# Patient Record
Sex: FEMALE | Race: WHITE | NOT HISPANIC OR LATINO | Employment: FULL TIME | URBAN - METROPOLITAN AREA
[De-identification: names, ages, dates, MRNs, and addresses within clinical notes are randomized per-mention and may not be internally consistent; named-entity substitution may affect disease eponyms.]

---

## 2019-05-25 ENCOUNTER — APPOINTMENT (EMERGENCY)
Dept: RADIOLOGY | Facility: HOSPITAL | Age: 54
End: 2019-05-25
Payer: COMMERCIAL

## 2019-05-25 ENCOUNTER — APPOINTMENT (EMERGENCY)
Dept: CT IMAGING | Facility: HOSPITAL | Age: 54
End: 2019-05-25
Payer: COMMERCIAL

## 2019-05-25 ENCOUNTER — HOSPITAL ENCOUNTER (EMERGENCY)
Facility: HOSPITAL | Age: 54
Discharge: HOME | End: 2019-05-25
Attending: EMERGENCY MEDICINE | Admitting: EMERGENCY MEDICINE
Payer: COMMERCIAL

## 2019-05-25 VITALS
OXYGEN SATURATION: 98 % | BODY MASS INDEX: 22.44 KG/M2 | RESPIRATION RATE: 16 BRPM | TEMPERATURE: 97.7 F | SYSTOLIC BLOOD PRESSURE: 138 MMHG | DIASTOLIC BLOOD PRESSURE: 76 MMHG | HEIGHT: 67 IN | HEART RATE: 75 BPM | WEIGHT: 143 LBS

## 2019-05-25 DIAGNOSIS — J32.9 CHRONIC CONGESTION OF PARANASAL SINUS: ICD-10-CM

## 2019-05-25 DIAGNOSIS — S90.112A: ICD-10-CM

## 2019-05-25 DIAGNOSIS — S00.83XA FACIAL CONTUSION, INITIAL ENCOUNTER: Primary | ICD-10-CM
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
## 2019-05-25 PROCEDURE — 73630 X-RAY EXAM OF FOOT: CPT

## 2019-05-25 PROCEDURE — 70486 CT MAXILLOFACIAL W/O DYE: CPT

## 2019-05-25 PROCEDURE — 99284 EMERGENCY DEPT VISIT MOD MDM: CPT

## 2019-05-25 PROCEDURE — 99284 EMERGENCY DEPT VISIT MOD MDM: CPT | Performed by: EMERGENCY MEDICINE

## 2019-05-25 RX ORDER — TRAMADOL HYDROCHLORIDE 50 MG/1
50 TABLET ORAL EVERY 6 HOURS PRN
Qty: 12 TABLET | Refills: 0 | Status: SHIPPED | OUTPATIENT
Start: 2019-05-25 | End: 2019-06-04

## 2019-05-25 RX ORDER — NAPROXEN 500 MG/1
500 TABLET ORAL 2 TIMES DAILY WITH MEALS
Qty: 30 TABLET | Refills: 0 | Status: SHIPPED | OUTPATIENT
Start: 2019-05-25 | End: 2019-11-11

## 2019-05-25 RX ORDER — GINSENG 100 MG
1 CAPSULE ORAL ONCE
Status: COMPLETED | OUTPATIENT
Start: 2019-05-25 | End: 2019-05-25

## 2019-05-25 RX ORDER — METHOCARBAMOL 750 MG/1
750 TABLET, FILM COATED ORAL 3 TIMES DAILY PRN
Qty: 30 TABLET | Refills: 0 | Status: SHIPPED | OUTPATIENT
Start: 2019-05-25 | End: 2019-11-11

## 2019-05-25 RX ADMIN — BACITRACIN 1 SMALL APPLICATION: 500 OINTMENT TOPICAL at 19:21

## 2019-07-09 ENCOUNTER — HOSPITAL ENCOUNTER (OUTPATIENT)
Dept: RADIOLOGY | Facility: HOSPITAL | Age: 54
Discharge: HOME | End: 2019-07-09
Attending: FAMILY MEDICINE
Payer: COMMERCIAL

## 2019-07-09 ENCOUNTER — OFFICE VISIT (OUTPATIENT)
Dept: OBGYN CLINIC | Facility: CLINIC | Age: 54
End: 2019-07-09
Payer: COMMERCIAL

## 2019-07-09 ENCOUNTER — TELEPHONE (OUTPATIENT)
Dept: OBGYN CLINIC | Facility: HOSPITAL | Age: 54
End: 2019-07-09

## 2019-07-09 ENCOUNTER — APPOINTMENT (OUTPATIENT)
Dept: RADIOLOGY | Facility: CLINIC | Age: 54
End: 2019-07-09
Payer: COMMERCIAL

## 2019-07-09 VITALS
HEIGHT: 67 IN | BODY MASS INDEX: 24.64 KG/M2 | DIASTOLIC BLOOD PRESSURE: 70 MMHG | SYSTOLIC BLOOD PRESSURE: 136 MMHG | WEIGHT: 157 LBS

## 2019-07-09 DIAGNOSIS — M25.521 PAIN IN RIGHT ELBOW: Primary | ICD-10-CM

## 2019-07-09 DIAGNOSIS — M77.11 LATERAL EPICONDYLITIS OF RIGHT ELBOW: ICD-10-CM

## 2019-07-09 DIAGNOSIS — M25.521 PAIN IN RIGHT ELBOW: ICD-10-CM

## 2019-07-09 DIAGNOSIS — M25.421 EFFUSION OF RIGHT ELBOW: ICD-10-CM

## 2019-07-09 PROCEDURE — 73080 X-RAY EXAM OF ELBOW: CPT

## 2019-07-09 PROCEDURE — 99204 OFFICE O/P NEW MOD 45 MIN: CPT | Performed by: FAMILY MEDICINE

## 2019-07-09 NOTE — TELEPHONE ENCOUNTER
Urgent care just called stating this patient's x-ray ended up in their work que  She just wanted to let you know
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
## 2019-07-12 ENCOUNTER — HOSPITAL ENCOUNTER (OUTPATIENT)
Dept: MRI IMAGING | Facility: HOSPITAL | Age: 54
Discharge: HOME | End: 2019-07-12
Attending: FAMILY MEDICINE
Payer: COMMERCIAL

## 2019-07-12 DIAGNOSIS — M25.421 EFFUSION OF RIGHT ELBOW: ICD-10-CM

## 2019-07-12 DIAGNOSIS — M25.521 PAIN IN RIGHT ELBOW: ICD-10-CM

## 2019-07-12 PROCEDURE — 73221 MRI JOINT UPR EXTREM W/O DYE: CPT

## 2019-07-16 ENCOUNTER — OFFICE VISIT (OUTPATIENT)
Dept: OBGYN CLINIC | Facility: CLINIC | Age: 54
End: 2019-07-16
Payer: COMMERCIAL

## 2019-07-16 VITALS
DIASTOLIC BLOOD PRESSURE: 66 MMHG | SYSTOLIC BLOOD PRESSURE: 119 MMHG | BODY MASS INDEX: 23.86 KG/M2 | WEIGHT: 152 LBS | HEIGHT: 67 IN

## 2019-07-16 DIAGNOSIS — S53.441A TEAR OF ULNAR COLLATERAL LIGAMENT OF RIGHT ELBOW, INITIAL ENCOUNTER: ICD-10-CM

## 2019-07-16 DIAGNOSIS — M25.521 PAIN IN RIGHT ELBOW: Primary | ICD-10-CM

## 2019-07-16 PROCEDURE — 99214 OFFICE O/P EST MOD 30 MIN: CPT | Performed by: FAMILY MEDICINE
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
## 2019-07-17 ENCOUNTER — TELEPHONE (OUTPATIENT)
Dept: OBGYN CLINIC | Facility: HOSPITAL | Age: 54
End: 2019-07-17
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
## 2019-07-18 NOTE — TELEPHONE ENCOUNTER
This was discussed at length during her appointment  My instructions were no bike riding at this time

## 2019-07-19 NOTE — TELEPHONE ENCOUNTER
I spoke with patient and advised she is not to ride bike at this time and Dr Vee Daniel did not give orders to immobilize at this time  Follow up with Dr Speedy Grimm on 7/29
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
## 2019-07-29 ENCOUNTER — OFFICE VISIT (OUTPATIENT)
Dept: OBGYN CLINIC | Facility: CLINIC | Age: 54
End: 2019-07-29
Payer: COMMERCIAL

## 2019-07-29 VITALS
DIASTOLIC BLOOD PRESSURE: 73 MMHG | SYSTOLIC BLOOD PRESSURE: 111 MMHG | WEIGHT: 154.2 LBS | HEIGHT: 67 IN | BODY MASS INDEX: 24.2 KG/M2 | HEART RATE: 67 BPM

## 2019-07-29 DIAGNOSIS — S53.441A TEAR OF ULNAR COLLATERAL LIGAMENT OF RIGHT ELBOW, INITIAL ENCOUNTER: Primary | ICD-10-CM

## 2019-07-29 DIAGNOSIS — M25.521 PAIN IN RIGHT ELBOW: ICD-10-CM

## 2019-07-29 PROCEDURE — 99213 OFFICE O/P EST LOW 20 MIN: CPT | Performed by: ORTHOPAEDIC SURGERY
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
## 2019-07-30 ENCOUNTER — EVALUATION (OUTPATIENT)
Dept: OCCUPATIONAL THERAPY | Facility: CLINIC | Age: 54
End: 2019-07-30
Payer: COMMERCIAL

## 2019-07-30 DIAGNOSIS — S53.441A TEAR OF ULNAR COLLATERAL LIGAMENT OF RIGHT ELBOW, INITIAL ENCOUNTER: ICD-10-CM

## 2019-07-30 PROCEDURE — 97165 OT EVAL LOW COMPLEX 30 MIN: CPT | Performed by: OCCUPATIONAL THERAPIST

## 2019-07-30 PROCEDURE — 97140 MANUAL THERAPY 1/> REGIONS: CPT | Performed by: OCCUPATIONAL THERAPIST

## 2019-07-30 PROCEDURE — 97110 THERAPEUTIC EXERCISES: CPT | Performed by: OCCUPATIONAL THERAPIST

## 2019-07-30 PROCEDURE — 97035 APP MDLTY 1+ULTRASOUND EA 15: CPT | Performed by: OCCUPATIONAL THERAPIST
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
## 2019-08-01 ENCOUNTER — OFFICE VISIT (OUTPATIENT)
Dept: OCCUPATIONAL THERAPY | Facility: CLINIC | Age: 54
End: 2019-08-01
Payer: COMMERCIAL

## 2019-08-01 DIAGNOSIS — S53.441A TEAR OF ULNAR COLLATERAL LIGAMENT OF RIGHT ELBOW, INITIAL ENCOUNTER: Primary | ICD-10-CM

## 2019-08-01 PROCEDURE — 97035 APP MDLTY 1+ULTRASOUND EA 15: CPT | Performed by: OCCUPATIONAL THERAPIST

## 2019-08-01 PROCEDURE — 97140 MANUAL THERAPY 1/> REGIONS: CPT | Performed by: OCCUPATIONAL THERAPIST

## 2019-08-01 PROCEDURE — 97110 THERAPEUTIC EXERCISES: CPT | Performed by: OCCUPATIONAL THERAPIST

## 2019-08-01 PROCEDURE — 97530 THERAPEUTIC ACTIVITIES: CPT | Performed by: OCCUPATIONAL THERAPIST

## 2019-08-01 NOTE — PROGRESS NOTES
Daily Note     Today's date: 2019  Patient name: Abelino Fang  : 1965  MRN: [id number]  Referring provider: Kt Lujan  Dx:   Encounter Diagnosis     ICD-10-CM    1  Tear of ulnar collateral ligament of right elbow, initial encounter S53 441A                   Subjective: I have been really sore       Objective: See treatment diary below      Assessment: Tolerated treatment well  Patient has local tenderness in the medial forearm  She has full ROM post tx   Plan: Continue per plan of care        Precautions:  UCL tear R elbow      Manual             retrograde 5m 5m           MFR flexor/pro  5m 5m                                     Ktape flexor/cubital 2m 2m               Exercise Diary             Shoulder ROM 10x 10x           Elbow ROM supine shld 90 10x 10x           Wrist ROM 10x 10x           Wrist e/f/rd  Elbow 90 3x10 2#  3x10           Hammer  p/s 3x10 3x10             ER/PRO on table  2#  3x10           Shoulder raise   2#  3x10                                                                                                                                                                       HEP - ROM and prec 4x day                Modalities             Cont US med elbow 1 2 8m 8m           CP post  5m

## 2019-08-06 ENCOUNTER — OFFICE VISIT (OUTPATIENT)
Dept: OCCUPATIONAL THERAPY | Facility: CLINIC | Age: 54
End: 2019-08-06
Payer: COMMERCIAL

## 2019-08-06 DIAGNOSIS — S53.441A TEAR OF ULNAR COLLATERAL LIGAMENT OF RIGHT ELBOW, INITIAL ENCOUNTER: Primary | ICD-10-CM

## 2019-08-06 PROCEDURE — 97140 MANUAL THERAPY 1/> REGIONS: CPT | Performed by: OCCUPATIONAL THERAPIST

## 2019-08-06 PROCEDURE — 97110 THERAPEUTIC EXERCISES: CPT | Performed by: OCCUPATIONAL THERAPIST

## 2019-08-06 PROCEDURE — 97035 APP MDLTY 1+ULTRASOUND EA 15: CPT | Performed by: OCCUPATIONAL THERAPIST

## 2019-08-06 PROCEDURE — 97530 THERAPEUTIC ACTIVITIES: CPT | Performed by: OCCUPATIONAL THERAPIST

## 2019-08-06 NOTE — PROGRESS NOTES
Daily Note     Today's date: 2019  Patient name: Francine Santiago  : 1965  MRN: [id number]  Referring provider: Fiorella Salter  Dx:   Encounter Diagnosis     ICD-10-CM    1  Tear of ulnar collateral ligament of right elbow, initial encounter S53 441A                   Subjective: I was sore      Objective: See treatment diary below      Assessment: Tolerated treatment well  Patient has full  Elbow ROm flexion   Sonido Balint She has mild tenderness  No loading of UCL during tx    Plan: Continue per plan of care        Precautions:  UCL tear R elbow      Manual            retrograde 5m 5m 5m          MFR flexor/pro  5m 5m 5m                                    Ktape flexor/cubital 2m 2m 2m              Exercise Diary            Shoulder ROM 10x 10x 10x          Elbow ROM supine shld 90 10x 10x 10x          Wrist ROM 10x 10x 10x          Wrist e/f/rd  Elbow 90 3x10 2#  3x10 2#  3x10          Hammer  p/s 3x10 3x10 3x10            ER/PRO on table  2#  3x10 2#  3x10          Shoulder raise   2#  3x10 2#  3x10                                                                                                                                                                      HEP - ROM and prec 4x day                Modalities   8          Cont US med elbow 1 2 8m 8m 8m          CP post  5m 5m

## 2019-08-08 ENCOUNTER — OFFICE VISIT (OUTPATIENT)
Dept: OCCUPATIONAL THERAPY | Facility: CLINIC | Age: 54
End: 2019-08-08
Payer: COMMERCIAL

## 2019-08-08 DIAGNOSIS — S53.441A TEAR OF ULNAR COLLATERAL LIGAMENT OF RIGHT ELBOW, INITIAL ENCOUNTER: Primary | ICD-10-CM

## 2019-08-08 PROCEDURE — 97035 APP MDLTY 1+ULTRASOUND EA 15: CPT | Performed by: OCCUPATIONAL THERAPIST

## 2019-08-08 PROCEDURE — 97110 THERAPEUTIC EXERCISES: CPT | Performed by: OCCUPATIONAL THERAPIST

## 2019-08-08 PROCEDURE — 97530 THERAPEUTIC ACTIVITIES: CPT | Performed by: OCCUPATIONAL THERAPIST

## 2019-08-08 PROCEDURE — 97140 MANUAL THERAPY 1/> REGIONS: CPT | Performed by: OCCUPATIONAL THERAPIST

## 2019-08-13 ENCOUNTER — OFFICE VISIT (OUTPATIENT)
Dept: OCCUPATIONAL THERAPY | Facility: CLINIC | Age: 54
End: 2019-08-13
Payer: COMMERCIAL

## 2019-08-13 DIAGNOSIS — S53.441A TEAR OF ULNAR COLLATERAL LIGAMENT OF RIGHT ELBOW, INITIAL ENCOUNTER: Primary | ICD-10-CM

## 2019-08-13 PROCEDURE — 97035 APP MDLTY 1+ULTRASOUND EA 15: CPT | Performed by: OCCUPATIONAL THERAPIST

## 2019-08-13 PROCEDURE — 97530 THERAPEUTIC ACTIVITIES: CPT | Performed by: OCCUPATIONAL THERAPIST

## 2019-08-13 PROCEDURE — 97140 MANUAL THERAPY 1/> REGIONS: CPT | Performed by: OCCUPATIONAL THERAPIST

## 2019-08-13 PROCEDURE — 97110 THERAPEUTIC EXERCISES: CPT | Performed by: OCCUPATIONAL THERAPIST

## 2019-08-13 NOTE — PROGRESS NOTES
Daily Note     Today's date: 2019  Patient name: Liliana Garcia  : 1965  MRN: [id number]  Referring provider: Nima Coppola  Dx:   Encounter Diagnosis     ICD-10-CM    1  Tear of ulnar collateral ligament of right elbow, initial encounter S53 441A                   Subjective: I am sore   Objective: See treatment diary below      Assessment: Tolerated treatment well  Patient has full ROM   She has tender flexor and biceps   Plan: Continue per plan of care        Precautions:  UCL tear R elbow      Manual          retrograde 5m 5m 5m 5m 5m        MFR flexor/pro  5m 5m 5m 5m 5m                                  Ktape flexor/cubital 2m 2m 2m 2m 2m            Exercise Diary          Shoulder ROM 10x 10x 10x 10x 10x        Elbow ROM supine shld 90 10x 10x 10x 10x 10x        Wrist ROM 10x 10x 10x 10x 10x        Wrist e/f/rd  Elbow 90 3x10 2#  3x10 2#  3x10 3#  3x10 3#  3x10        Hammer  p/s 3x10 3x10 3x10 3x10 3x10          ER/PRO on table  2#  3x10 2#  3x10 3#  3x10 3#  3x10        Shoulder raise   2#  3x10 2#  3x10 3#  3x10 3#  3x10                                                                                                                                                                    HEP - ROM and prec 4x day                Modalities           Cont US med elbow 1 2 8m 8m 8m 8m         CP post  5m 5m 5m

## 2019-08-15 ENCOUNTER — APPOINTMENT (OUTPATIENT)
Dept: OCCUPATIONAL THERAPY | Facility: CLINIC | Age: 54
End: 2019-08-15
Payer: COMMERCIAL

## 2019-08-20 ENCOUNTER — OFFICE VISIT (OUTPATIENT)
Dept: OCCUPATIONAL THERAPY | Facility: CLINIC | Age: 54
End: 2019-08-20
Payer: COMMERCIAL

## 2019-08-20 DIAGNOSIS — S53.441A TEAR OF ULNAR COLLATERAL LIGAMENT OF RIGHT ELBOW, INITIAL ENCOUNTER: Primary | ICD-10-CM

## 2019-08-20 PROCEDURE — 97110 THERAPEUTIC EXERCISES: CPT | Performed by: OCCUPATIONAL THERAPIST

## 2019-08-20 PROCEDURE — 97035 APP MDLTY 1+ULTRASOUND EA 15: CPT | Performed by: OCCUPATIONAL THERAPIST

## 2019-08-20 PROCEDURE — 97140 MANUAL THERAPY 1/> REGIONS: CPT | Performed by: OCCUPATIONAL THERAPIST

## 2019-08-20 NOTE — PROGRESS NOTES
Daily Note     Today's date: 2019  Patient name: Denise Moctezuma  : 1965  MRN: [id number]  Referring provider: Eliana Rea  Dx:   Encounter Diagnosis     ICD-10-CM    1  Tear of ulnar collateral ligament of right elbow, initial encounter S53 441A                   Subjective: I am feeling pretty good  I have been taking the brace off at night      Objective: See treatment diary below      Assessment: Tolerated treatment well  Patient has improved symptoms      Plan: Continue per plan of care        Precautions:  UCL tear R elbow      Manual         retrograde 5m 5m 5m 5m 5m 5m       MFR flexor/pro  5m 5m 5m 5m 5m 5m                                 Ktape flexor/cubital 2m 2m 2m 2m 2m 2m           Exercise Diary         Shoulder ROM 10x 10x 10x 10x 10x 10x       Elbow ROM supine shld 90 10x 10x 10x 10x 10x 10x       Wrist ROM 10x 10x 10x 10x 10x 10x       Wrist e/f/rd  Elbow 90 3x10 2#  3x10 2#  3x10 3#  3x10 3#  3x10 3#  3x10       Hammer  p/s 3x10 3x10 3x10 3x10 3x10 3x10         ER/PRO on table  2#  3x10 2#  3x10 3#  3x10 3#  3x10 3#  3x10       Shoulder raise   2#  3x10 2#  3x10 3#  3x10 3#  3x10 3#  3x10        PW      Black  3x10                                                                                                                                                      HEP - ROM and prec 4x day                Modalities          Cont US med elbow 1 2 8m 8m 8m 8m 8m        CP post  5m 5m 5m 5m

## 2019-08-22 ENCOUNTER — OFFICE VISIT (OUTPATIENT)
Dept: OCCUPATIONAL THERAPY | Facility: CLINIC | Age: 54
End: 2019-08-22
Payer: COMMERCIAL

## 2019-08-22 DIAGNOSIS — S53.441A TEAR OF ULNAR COLLATERAL LIGAMENT OF RIGHT ELBOW, INITIAL ENCOUNTER: Primary | ICD-10-CM

## 2019-08-22 PROCEDURE — 97140 MANUAL THERAPY 1/> REGIONS: CPT | Performed by: OCCUPATIONAL THERAPIST

## 2019-08-22 PROCEDURE — 97530 THERAPEUTIC ACTIVITIES: CPT | Performed by: OCCUPATIONAL THERAPIST

## 2019-08-22 PROCEDURE — 97110 THERAPEUTIC EXERCISES: CPT | Performed by: OCCUPATIONAL THERAPIST

## 2019-08-22 PROCEDURE — 97035 APP MDLTY 1+ULTRASOUND EA 15: CPT | Performed by: OCCUPATIONAL THERAPIST

## 2019-08-22 NOTE — PROGRESS NOTES
Daily Note     Today's date: 2019  Patient name: Elva Emery  : 1965  MRN: [id number]  Referring provider: Radha Rivera  Dx:   Encounter Diagnosis     ICD-10-CM    1  Tear of ulnar collateral ligament of right elbow, initial encounter S53 441A                   Subjective: I am a little sore       Objective: See treatment diary below      Assessment: Tolerated treatment well  Patient has tender pronator  Plan: Continue per plan of care        Precautions:  UCL tear R elbow      Manual        retrograde 5m 5m 5m 5m 5m 5m 5m      MFR flexor/pro  5m 5m 5m 5m 5m 5m 5m                                Ktape flexor/cubital 2m 2m 2m 2m 2m 2m 2m          Exercise Diary        Shoulder ROM 10x 10x 10x 10x 10x 10x 10x      Elbow ROM supine shld 90 10x 10x 10x 10x 10x 10x 10x      Wrist ROM 10x 10x 10x 10x 10x 10x 10x      Wrist e/f/rd  Elbow 90 3x10 2#  3x10 2#  3x10 3#  3x10 3#  3x10 3#  3x10 3#  3x10      Hammer  p/s 3x10 3x10 3x10 3x10 3x10 3x10 3x10        ER/PRO on table  2#  3x10 2#  3x10 3#  3x10 3#  3x10 3#  3x10 3#  3x10      Shoulder raise   2#  3x10 2#  3x10 3#  3x10 3#  3x10 3#  3x10 3#  3x10       PW      Black  3x10 Black  3x10                                                                                                                                                     HEP - ROM and prec 4x day                Modalities         Cont US med elbow 1 2 8m 8m 8m 8m 8m 8m       CP post  5m 5m 5m 5m 5m

## 2019-08-29 ENCOUNTER — OFFICE VISIT (OUTPATIENT)
Dept: OCCUPATIONAL THERAPY | Facility: CLINIC | Age: 54
End: 2019-08-29
Payer: COMMERCIAL

## 2019-08-29 DIAGNOSIS — S53.441A TEAR OF ULNAR COLLATERAL LIGAMENT OF RIGHT ELBOW, INITIAL ENCOUNTER: Primary | ICD-10-CM

## 2019-08-29 PROCEDURE — 97140 MANUAL THERAPY 1/> REGIONS: CPT | Performed by: OCCUPATIONAL THERAPIST

## 2019-08-29 PROCEDURE — 97110 THERAPEUTIC EXERCISES: CPT | Performed by: OCCUPATIONAL THERAPIST

## 2019-08-29 PROCEDURE — 97530 THERAPEUTIC ACTIVITIES: CPT | Performed by: OCCUPATIONAL THERAPIST

## 2019-08-29 NOTE — PROGRESS NOTES
Daily Note     Today's date: 2019  Patient name: Lynda Vargas  : 1965  MRN: [id number]  Referring provider: Floridalma Guerra  Dx:   Encounter Diagnosis     ICD-10-CM    1  Tear of ulnar collateral ligament of right elbow, initial encounter S53 441A                   Subjective: I am doing       Objective: See treatment diary below      Assessment: Tolerated treatment well  Patient has full ROM   She has continued tenderness  Plan: Continue per plan of care        Precautions:  UCL tear R elbow      Manual       retrograde 5m 5m 5m 5m 5m 5m 5m 5m     MFR flexor/pro  5m 5m 5m 5m 5m 5m 5m 5m                               Ktape flexor/cubital 2m 2m 2m 2m 2m 2m 2m          Exercise Diary       Shoulder ROM 10x 10x 10x 10x 10x 10x 10x 10x     Elbow ROM supine shld 90 10x 10x 10x 10x 10x 10x 10x 10x     Wrist ROM 10x 10x 10x 10x 10x 10x 10x 10x     Wrist e/f/rd  Elbow 90 3x10 2#  3x10 2#  3x10 3#  3x10 3#  3x10 3#  3x10 3#  3x10 4#  3x10     Hammer  p/s 3x10 3x10 3x10 3x10 3x10 3x10 3x10 3x10       ER/PRO on table  2#  3x10 2#  3x10 3#  3x10 3#  3x10 3#  3x10 3#  3x10 4#  3x10     Shoulder raise   2#  3x10 2#  3x10 3#  3x10 3#  3x10 3#  3x10 3#  3x10 4#  3x10      PW      Black  3x10 Black  3x10 Black  3x10                                                                                                                                                    HEP - ROM and prec 4x day                Modalities         Cont US med elbow 1 2 8m 8m 8m 8m 8m 8m       CP post  5m 5m 5m 5m 5m

## 2019-09-11 ENCOUNTER — OFFICE VISIT (OUTPATIENT)
Dept: OBGYN CLINIC | Facility: HOSPITAL | Age: 54
End: 2019-09-11
Payer: COMMERCIAL

## 2019-09-11 VITALS
BODY MASS INDEX: 24.96 KG/M2 | WEIGHT: 159 LBS | HEIGHT: 67 IN | HEART RATE: 65 BPM | DIASTOLIC BLOOD PRESSURE: 73 MMHG | SYSTOLIC BLOOD PRESSURE: 110 MMHG

## 2019-09-11 DIAGNOSIS — S53.441D TEAR OF ULNAR COLLATERAL LIGAMENT OF RIGHT ELBOW, SUBSEQUENT ENCOUNTER: Primary | ICD-10-CM

## 2019-09-11 PROCEDURE — 99213 OFFICE O/P EST LOW 20 MIN: CPT | Performed by: ORTHOPAEDIC SURGERY

## 2019-09-11 NOTE — PROGRESS NOTES
ASSESSMENT/PLAN:    Assessment:   Right elbow ulnar collateral ligament tear healing    Plan:   Continue with activity modification   Continue with physical therapy   Discontinue brace   Lengthy discussion was had with the patient regarding this  She is aware that this does require time for the ligament to heal   We once again stressed the avoidance of all significant stressful activities in till the elbow was strength in  She will continue with therapy  She voices understanding and will try to comply  Follow Up:  8  week(s)    To Do Next Visit:  Re-evaluate     General Discussions:      Operative Discussions:   patient was advised that I would not recommend surgical intervention to repair the UCL    _____________________________________________________  CHIEF COMPLAINT:  Chief Complaint   Patient presents with    Left Elbow - Follow-up         SUBJECTIVE:  Cassidy Cervantes is a 49 y o  female who presents for follow up regarding for follow-up evaluation of her right elbow  Her last evaluation patient was allowed to unlock her hinged elbow brace  She has been compliant attending hand therapy for range of motion and strengthening exercises  Patient states she is overall very frustrated with her progress  She still notes pain about the elbow  She states her pain is limiting her from doing her activities of daily living  She has not been able to return to mountain biking  She states she has been compliant wearing her brace however at times she notices she removes it at night in her sleep  She would like to get back to biking and would like to discuss surgical intervention as well as other options today      PAST MEDICAL HISTORY:  Past Medical History:   Diagnosis Date    Plantar fasciitis     Last assessed 2015        PAST SURGICAL HISTORY:  Past Surgical History:   Procedure Laterality Date    DILATION AND CURETTAGE OF UTERUS      INDUCED       Surgical treatment of spontaneous  FAMILY HISTORY:  Family History   Problem Relation Age of Onset    No Known Problems Mother     No Known Problems Father        SOCIAL HISTORY:  Social History     Tobacco Use    Smoking status: Never Smoker    Smokeless tobacco: Never Used   Substance Use Topics    Alcohol use: Never     Frequency: Never    Drug use: Never       MEDICATIONS:    Current Outpatient Medications:     Elastic Bandages & Supports (ADJUSTABLE ARM SLING) MISC, by Does not apply route daily (Patient not taking: Reported on 7/29/2019), Disp: 1 each, Rfl: 0    methocarbamol (ROBAXIN) 750 mg tablet, Take 1 tablet (750 mg total) by mouth 3 (three) times a day as needed for muscle spasms (Patient not taking: Reported on 7/29/2019), Disp: 30 tablet, Rfl: 0    naproxen (NAPROSYN) 500 mg tablet, Take 1 tablet (500 mg total) by mouth 2 (two) times a day with meals (Patient not taking: Reported on 7/29/2019), Disp: 30 tablet, Rfl: 0    ALLERGIES:  No Known Allergies    REVIEW OF SYSTEMS:  Pertinent items are noted in HPI      LABS:  HgA1c: No results found for: HGBA1C  BMP: No results found for: GLUCOSE, CALCIUM, NA, K, CO2, CL, BUN, CREATININE        _____________________________________________________  PHYSICAL EXAMINATION:  Vital signs: /73   Pulse 65   Ht 5' 7" (1 702 m)   Wt 72 1 kg (159 lb)   BMI 24 90 kg/m²   General: well developed and well nourished, alert, oriented times 3 and appears comfortable  Psychiatric: Normal  HEENT: Trachea Midline, No torticollis  Cardiovascular: No discernable arrhythmia  Pulmonary: No wheezing or stridor  Skin: No masses, erthema, lacerations, fluctation, ulcerations  Neurovascular: Sensation Intact to the Median, Ulnar, Radial Nerve, Motor Intact to the Median, Ulnar, Radial Nerve and Pulses Intact    MUSCULOSKELETAL EXAMINATION:  RIGHT SIDE:  Elbow:  Full Motion, No Tenderness, No Instability, minimal pain with milking maneuver _____________________________________________________  STUDIES REVIEWED:  No Studies to review      PROCEDURES PERFORMED:  Procedures  No Procedures performed today   Scribe Attestation    I,:   Shabbir Armijo MA am acting as a scribe while in the presence of the attending physician :        I,:   Kristel Coles MD personally performed the services described in this documentation    as scribed in my presence :

## 2019-09-12 ENCOUNTER — OFFICE VISIT (OUTPATIENT)
Dept: OCCUPATIONAL THERAPY | Facility: CLINIC | Age: 54
End: 2019-09-12
Payer: COMMERCIAL

## 2019-09-12 DIAGNOSIS — S53.441D TEAR OF ULNAR COLLATERAL LIGAMENT OF RIGHT ELBOW, SUBSEQUENT ENCOUNTER: ICD-10-CM

## 2019-09-12 PROCEDURE — 97530 THERAPEUTIC ACTIVITIES: CPT | Performed by: OCCUPATIONAL THERAPIST

## 2019-09-12 PROCEDURE — 97035 APP MDLTY 1+ULTRASOUND EA 15: CPT | Performed by: OCCUPATIONAL THERAPIST

## 2019-09-12 PROCEDURE — 97140 MANUAL THERAPY 1/> REGIONS: CPT | Performed by: OCCUPATIONAL THERAPIST

## 2019-09-12 PROCEDURE — 97110 THERAPEUTIC EXERCISES: CPT | Performed by: OCCUPATIONAL THERAPIST

## 2019-09-13 NOTE — PROGRESS NOTES
Daily Note     Today's date:2019  Patient name: Janna Baldwin  : 1965  MRN: [id number]  Referring provider: Jarred Mckinney  Dx:   Encounter Diagnosis     ICD-10-CM    1  Tear of ulnar collateral ligament of right elbow, subsequent encounter S53 441D Ambulatory referral to PT/OT hand therapy                  Subjective: Dr Deonna Godoy said no brace   I am doing better but I want to bike      Objective: See treatment diary below      Assessment: Tolerated treatment well  Patient had mild fatigue   She has mild pronator tenderness  Plan: Continue per plan of care        Precautions:  UCL tear R elbow        Manual              MOB              MFR flexor/pro  5m            Graston flexor/pronator at elbow 5m                         Ktape flexor/cubital                 Exercise Diary              Shoulder ROM 10x              10x            Wrist ROM 10x            Wrist e/f/rd  Elbow 90 4#  3x10              Hammer  p/s 3x10            flexbar Red   3x10            Biceps curl Dowel 8#  3x10            UBE 6M             Tband ret Blue   3x10            Tband shld ext Blue  3x10            powerweb  Blue 3x10            ER/IR 3#  3x10                                                                                                       HEP - ROM and prec 4x day                Modalities              cont US med elbow 1 2 8m                                            Manual       retrograde 5m 5m 5m 5m 5m 5m 5m 5m     MFR flexor/pro  5m 5m 5m 5m 5m 5m 5m 5m                               Ktape flexor/cubital 2m 2m 2m 2m 2m 2m 2m          Exercise Diary       Shoulder ROM 10x 10x 10x 10x 10x 10x 10x 10x     Elbow ROM supine shld 90 10x 10x 10x 10x 10x 10x 10x 10x     Wrist ROM 10x 10x 10x 10x 10x 10x 10x 10x     Wrist e/f/rd  Elbow 90 3x10 2#  3x10 2#  3x10 3#  3x10 3#  3x10 3#  3x10 3#  3x10 4#  3x10     Hammer  p/s 3x10 3x10 3x10 3x10 3x10 3x10 3x10 3x10       ER/PRO on table  2#  3x10 2#  3x10 3#  3x10 3#  3x10 3#  3x10 3#  3x10 4#  3x10     Shoulder raise   2#  3x10 2#  3x10 3#  3x10 3#  3x10 3#  3x10 3#  3x10 4#  3x10      PW      Black  3x10 Black  3x10 Black  3x10                                                                                                                                                    HEP - ROM and prec 4x day                Modalities  7/30 8/1 8/6 8/8 8/20 8/22       Cont US med elbow 1 2 8m 8m 8m 8m 8m 8m       CP post  5m 5m 5m 5m 5m

## 2019-09-19 ENCOUNTER — OFFICE VISIT (OUTPATIENT)
Dept: OCCUPATIONAL THERAPY | Facility: CLINIC | Age: 54
End: 2019-09-19
Payer: COMMERCIAL

## 2019-09-19 ENCOUNTER — APPOINTMENT (OUTPATIENT)
Dept: OCCUPATIONAL THERAPY | Facility: CLINIC | Age: 54
End: 2019-09-19
Payer: COMMERCIAL

## 2019-09-19 DIAGNOSIS — S53.441D TEAR OF ULNAR COLLATERAL LIGAMENT OF RIGHT ELBOW, SUBSEQUENT ENCOUNTER: Primary | ICD-10-CM

## 2019-09-19 PROCEDURE — 97530 THERAPEUTIC ACTIVITIES: CPT | Performed by: OCCUPATIONAL THERAPIST

## 2019-09-19 PROCEDURE — 97110 THERAPEUTIC EXERCISES: CPT | Performed by: OCCUPATIONAL THERAPIST

## 2019-09-19 PROCEDURE — 97140 MANUAL THERAPY 1/> REGIONS: CPT | Performed by: OCCUPATIONAL THERAPIST

## 2019-09-19 PROCEDURE — 97035 APP MDLTY 1+ULTRASOUND EA 15: CPT | Performed by: OCCUPATIONAL THERAPIST

## 2019-09-19 NOTE — PROGRESS NOTES
Daily Note     Today's date: 2019  Patient name: Clint Herndon  : 1965  MRN: [id number]  Referring provider: Mirtha Hutchins  Dx:   Encounter Diagnosis     ICD-10-CM    1  Tear of ulnar collateral ligament of right elbow, subsequent encounter S53 441D                   Subjective: I am sore      Objective: See treatment diary below      Assessment: Tolerated treatment well  Patient has full ROM   She tenderness medial elbow   Plan: Continue per plan of care        Precautions:  UCL tear R elbow        Manual             MOB              MFR flexor/pro  5m 5m           Graston flexor/pronator at elbow 5m 5m                        Ktape flexor/cubital                 Exercise Diary  9/12 9/10           Shoulder ROM 10x 10x             10x 10x           Wrist ROM 10x 10x           Wrist e/f/rd  Elbow 90 4#  3x10   4#  3x10           Hammer  p/s 3x10 3x10           flexbar Red   3x10 Red  3x10           Biceps curl Dowel 8#  3x10 Dowel 8#  3x10           UBE 6M  6m           Tband ret Blue   3x10 Blue  3x10           Tband shld ext Blue  3x10 Blue  3x10           powerweb  Blue 3x10 Blue   3x10           ER/IR 3#  3x10 3#  3x10                                                                                                      HEP - ROM and prec 4x day                Modalities              cont US med elbow 1 2 8m                                            Manual       retrograde 5m 5m 5m 5m 5m 5m 5m 5m     MFR flexor/pro  5m 5m 5m 5m 5m 5m 5m 5m                               Ktape flexor/cubital 2m 2m 2m 2m 2m 2m 2m          Exercise Diary       Shoulder ROM 10x 10x 10x 10x 10x 10x 10x 10x     Elbow ROM supine shld 90 10x 10x 10x 10x 10x 10x 10x 10x     Wrist ROM 10x 10x 10x 10x 10x 10x 10x 10x     Wrist e/f/rd  Elbow 90 3x10 2#  3x10 2#  3x10 3#  3x10 3#  3x10 3#  3x10 3#  3x10 4#  3x10     Hammer  p/s 3x10 3x10 3x10 3x10 3x10 3x10 3x10 3x10       ER/PRO on table  2#  3x10 2#  3x10 3#  3x10 3#  3x10 3#  3x10 3#  3x10 4#  3x10     Shoulder raise   2#  3x10 2#  3x10 3#  3x10 3#  3x10 3#  3x10 3#  3x10 4#  3x10      PW      Black  3x10 Black  3x10 Black  3x10                                                                                                                                                    HEP - ROM and prec 4x day                Modalities  7/30 8/1 8/6 8/8 8/20 8/22       Cont US med elbow 1 2 8m 8m 8m 8m 8m 8m       CP post  5m 5m 5m 5m 5m

## 2019-09-24 ENCOUNTER — OFFICE VISIT (OUTPATIENT)
Dept: OCCUPATIONAL THERAPY | Facility: CLINIC | Age: 54
End: 2019-09-24
Payer: COMMERCIAL

## 2019-09-24 DIAGNOSIS — S53.441D TEAR OF ULNAR COLLATERAL LIGAMENT OF RIGHT ELBOW, SUBSEQUENT ENCOUNTER: Primary | ICD-10-CM

## 2019-09-24 PROCEDURE — 97140 MANUAL THERAPY 1/> REGIONS: CPT | Performed by: OCCUPATIONAL THERAPIST

## 2019-09-24 PROCEDURE — 97530 THERAPEUTIC ACTIVITIES: CPT | Performed by: OCCUPATIONAL THERAPIST

## 2019-09-24 PROCEDURE — 97110 THERAPEUTIC EXERCISES: CPT | Performed by: OCCUPATIONAL THERAPIST

## 2019-09-24 NOTE — PROGRESS NOTES
Daily Note     Today's date: 2019  Patient name: Janna Baldwin  : 1965  MRN: [id number]  Referring provider: Jarred Mckinney  Dx:   Encounter Diagnosis     ICD-10-CM    1  Tear of ulnar collateral ligament of right elbow, subsequent encounter S53 441D                   Subjective: I am doing better      Objective: See treatment diary below      Assessment: Tolerated treatment well  Patient has full ROM   She has mild tenderness  Plan: Continue per plan of care        Precautions:  UCL tear R elbow        Manual            MOB              MFR flexor/pro  5m 5m 5m          Graston flexor/pronator at elbow 5m 5m 5m                       Ktape flexor/cubital    1m              Exercise Diary            Shoulder ROM 10x 10x 10x            10x 10x 10x          Wrist ROM 10x 10x 10x          Wrist e/f/rd  Elbow 90 4#  3x10   4#  3x10 4#  3x10          Hammer  p/s 3x10 3x10 3x10          flexbar Red   3x10 Red  3x10 Green  3x10          Biceps curl Dowel 8#  3x10 Dowel 8#  3x10 Dowel 10#  3x10          UBE 6M  6m 8m          Tband ret Blue   3x10 Blue  3x10 Blue  3x10          Tband shld ext Blue  3x10 Blue  3x10 Blue  3x10          powerweb  Blue 3x10 Blue   3x10 Black  3x10          ER/IR 3#  3x10 3#  3x10                                                                                                      HEP - ROM and prec 4x day                Modalities            cont US med elbow 1 2 8m            MHP   10m                             Manual       retrograde 5m 5m 5m 5m 5m 5m 5m 5m     MFR flexor/pro  5m 5m 5m 5m 5m 5m 5m 5m                               Ktape flexor/cubital 2m 2m 2m 2m 2m 2m 2m          Exercise Diary       Shoulder ROM 10x 10x 10x 10x 10x 10x 10x 10x     Elbow ROM supine shld 90 10x 10x 10x 10x 10x 10x 10x 10x     Wrist ROM 10x 10x 10x 10x 10x 10x 10x 10x     Wrist e/f/rd  Elbow 90 3x10 2#  3x10 2#  3x10 3#  3x10 3#  3x10 3#  3x10 3#  3x10 4#  3x10     Hammer  p/s 3x10 3x10 3x10 3x10 3x10 3x10 3x10 3x10       ER/PRO on table  2#  3x10 2#  3x10 3#  3x10 3#  3x10 3#  3x10 3#  3x10 4#  3x10     Shoulder raise   2#  3x10 2#  3x10 3#  3x10 3#  3x10 3#  3x10 3#  3x10 4#  3x10      PW      Black  3x10 Black  3x10 Black  3x10                                                                                                                                                    HEP - ROM and prec 4x day                Modalities  7/30 8/1 8/6 8/8 8/20 8/22       Cont US med elbow 1 2 8m 8m 8m 8m 8m 8m       CP post  5m 5m 5m 5m 5m

## 2019-09-26 ENCOUNTER — APPOINTMENT (OUTPATIENT)
Dept: OCCUPATIONAL THERAPY | Facility: CLINIC | Age: 54
End: 2019-09-26
Payer: COMMERCIAL

## 2019-10-01 ENCOUNTER — OFFICE VISIT (OUTPATIENT)
Dept: OCCUPATIONAL THERAPY | Facility: CLINIC | Age: 54
End: 2019-10-01
Payer: COMMERCIAL

## 2019-10-01 DIAGNOSIS — S53.441D TEAR OF ULNAR COLLATERAL LIGAMENT OF RIGHT ELBOW, SUBSEQUENT ENCOUNTER: Primary | ICD-10-CM

## 2019-10-01 PROCEDURE — 97530 THERAPEUTIC ACTIVITIES: CPT | Performed by: OCCUPATIONAL THERAPIST

## 2019-10-01 PROCEDURE — 97035 APP MDLTY 1+ULTRASOUND EA 15: CPT | Performed by: OCCUPATIONAL THERAPIST

## 2019-10-01 PROCEDURE — 97110 THERAPEUTIC EXERCISES: CPT | Performed by: OCCUPATIONAL THERAPIST

## 2019-10-01 PROCEDURE — 97140 MANUAL THERAPY 1/> REGIONS: CPT | Performed by: OCCUPATIONAL THERAPIST

## 2019-10-01 NOTE — PROGRESS NOTES
Daily Note     Today's date: 10/1/2019  Patient name: Konstantin Parrish  : 1965  MRN: [id number]  Referring provider: Alfredo Aguirre  Dx:   Encounter Diagnosis     ICD-10-CM    1  Tear of ulnar collateral ligament of right elbow, subsequent encounter S53 441D                   Subjective: I want to bike      Objective: See treatment diary below      Assessment: Tolerated treatment well  Patient has regained full ROM   She has minimal pain   Plan: Continue per plan of care        Precautions:  UCL tear R elbow        Manual  9/12 9/19 9/24 10/1         MOB              MFR flexor/pro  5m 5m 5m 5m         Graston flexor/pronator at elbow 5m 5m 5m 5m                      Ktape flexor/cubital    1m 1m             Exercise Diary  9/12 9/19 9/24 10/1         Shoulder ROM 10x 10x 10x 10x           10x 10x 10x 10x         Wrist ROM 10x 10x 10x 10x         Wrist e/f/rd  Elbow 90 4#  3x10   4#  3x10 4#  3x10 4#  3x10         Hammer  p/s 3x10 3x10 3x10 3x10         flexbar Red   3x10 Red  3x10 Green  3x10 Green  3x10         Biceps curl Dowel 8#  3x10 Dowel 8#  3x10 Dowel 10#  3x10 Dowel  14#  3x10         UBE 6M  6m 8m 10m         Tband ret Blue   3x10 Blue  3x10 Blue  3x10 Blue  3x10         Tband shld ext Blue  3x10 Blue  3x10 Blue  3x10 Blue   3x10         powerweb  Blue 3x10 Blue   3x10 Black  3x10 Black  3x10         ER/IR 3#  3x10 3#  3x10  OR  3x10                                                                                                    HEP - ROM and prec 4x day                Modalities  9/12 9/19 9/24 10/1         cont US med elbow 1 2 8m   8m         MHP   10m                             Manual       retrograde 5m 5m 5m 5m 5m 5m 5m 5m     MFR flexor/pro  5m 5m 5m 5m 5m 5m 5m 5m                               Ktape flexor/cubital 2m 2m 2m 2m 2m 2m 2m          Exercise Diary       Shoulder ROM 10x 10x 10x 10x 10x 10x 10x 10x Elbow ROM supine shld 90 10x 10x 10x 10x 10x 10x 10x 10x     Wrist ROM 10x 10x 10x 10x 10x 10x 10x 10x     Wrist e/f/rd  Elbow 90 3x10 2#  3x10 2#  3x10 3#  3x10 3#  3x10 3#  3x10 3#  3x10 4#  3x10     Hammer  p/s 3x10 3x10 3x10 3x10 3x10 3x10 3x10 3x10       ER/PRO on table  2#  3x10 2#  3x10 3#  3x10 3#  3x10 3#  3x10 3#  3x10 4#  3x10     Shoulder raise   2#  3x10 2#  3x10 3#  3x10 3#  3x10 3#  3x10 3#  3x10 4#  3x10      PW      Black  3x10 Black  3x10 Black  3x10                                                                                                                                                    HEP - ROM and prec 4x day                Modalities  7/30 8/1 8/6 8/8 8/20 8/22       Cont US med elbow 1 2 8m 8m 8m 8m 8m 8m       CP post  5m 5m 5m 5m 5m

## 2019-10-03 ENCOUNTER — OFFICE VISIT (OUTPATIENT)
Dept: OCCUPATIONAL THERAPY | Facility: CLINIC | Age: 54
End: 2019-10-03
Payer: COMMERCIAL

## 2019-10-03 DIAGNOSIS — S53.441D TEAR OF ULNAR COLLATERAL LIGAMENT OF RIGHT ELBOW, SUBSEQUENT ENCOUNTER: Primary | ICD-10-CM

## 2019-10-03 DIAGNOSIS — S53.441A TEAR OF ULNAR COLLATERAL LIGAMENT OF RIGHT ELBOW, INITIAL ENCOUNTER: ICD-10-CM

## 2019-10-03 PROCEDURE — 97035 APP MDLTY 1+ULTRASOUND EA 15: CPT | Performed by: OCCUPATIONAL THERAPIST

## 2019-10-03 PROCEDURE — 97140 MANUAL THERAPY 1/> REGIONS: CPT | Performed by: OCCUPATIONAL THERAPIST

## 2019-10-03 PROCEDURE — 97110 THERAPEUTIC EXERCISES: CPT | Performed by: OCCUPATIONAL THERAPIST

## 2019-10-03 PROCEDURE — 97530 THERAPEUTIC ACTIVITIES: CPT | Performed by: OCCUPATIONAL THERAPIST

## 2019-10-03 NOTE — PROGRESS NOTES
Daily Note     Today's date: 10/3/2019  Patient name: Durga Mcrae  : 1965  MRN: [id number]  Referring provider: Jannie Whitehead  Dx:   Encounter Diagnosis     ICD-10-CM    1  Tear of ulnar collateral ligament of right elbow, subsequent encounter S53 441D    2  Tear of ulnar collateral ligament of right elbow, initial encounter S53 441A                   Subjective: I am doing better      Objective: See treatment diary below      Assessment: Tolerated treatment well  Patient has full ROM   She has improved pain levels   61#  Plan: Continue per plan of care   /REEVAL      Precautions:  UCL tear R elbow        Manual  9/12 9/19 9/24 10/1 10/3        MOB              MFR flexor/pro  5m 5m 5m 5m 5m        Graston flexor/pronator at elbow 5m 5m 5m 5m 5m                     Ktape flexor/cubital    1m 1m 1m            Exercise Diary  9/12 9/19 9/24 10/1 10/3        Shoulder ROM 10x 10x 10x 10x 10x          10x 10x 10x 10x 10x        Wrist ROM 10x 10x 10x 10x 10x        Wrist e/f/rd  Elbow 90 4#  3x10   4#  3x10 4#  3x10 4#  3x10 5#  3x10        Hammer  p/s 3x10 3x10 3x10 3x10 3x10        flexbar Red   3x10 Red  3x10 Green  3x10 Green  3x10 Green  3x10        Biceps curl Dowel 8#  3x10 Dowel 8#  3x10 Dowel 10#  3x10 Dowel  14#  3x10 Dowel  14#  3x10        UBE 6M  6m 8m 10m 10m        Tband ret Blue   3x10 Blue  3x10 Blue  3x10 Blue  3x10 Blue  3x10        Tband shld ext Blue  3x10 Blue  3x10 Blue  3x10 Blue   3x10 Blue  3x10        powerweb  Blue 3x10 Blue   3x10 Black  3x10 Black  3x10 Black  3x10        ER/IR 3#  3x10 3#  3x10  OR  3x10 OR  3x10                                                                                                   HEP - ROM and prec 4x day                Modalities  9/12 9/19 9/24 10/1 10/3        cont US med elbow 1 2 8m   8m 8m        MHP   10m                             Manual       retrograde 5m 5m 5m 5m 5m 5m 5m 5m     MFR flexor/pro  5m 5m 5m 5m 5m 5m 5m 5m                               Ktape flexor/cubital 2m 2m 2m 2m 2m 2m 2m          Exercise Diary  7/30 8/1 8/6 8/8 8/13 8/20 8/22 8/29     Shoulder ROM 10x 10x 10x 10x 10x 10x 10x 10x     Elbow ROM supine shld 90 10x 10x 10x 10x 10x 10x 10x 10x     Wrist ROM 10x 10x 10x 10x 10x 10x 10x 10x     Wrist e/f/rd  Elbow 90 3x10 2#  3x10 2#  3x10 3#  3x10 3#  3x10 3#  3x10 3#  3x10 4#  3x10     Hammer  p/s 3x10 3x10 3x10 3x10 3x10 3x10 3x10 3x10       ER/PRO on table  2#  3x10 2#  3x10 3#  3x10 3#  3x10 3#  3x10 3#  3x10 4#  3x10     Shoulder raise   2#  3x10 2#  3x10 3#  3x10 3#  3x10 3#  3x10 3#  3x10 4#  3x10      PW      Black  3x10 Black  3x10 Black  3x10     Body blade sagital plane  Overhead/side        2x30 sec                                                                                                                                       HEP - ROM and prec 4x day                Modalities  7/30 8/1 8/6 8/8 8/20 8/22       Cont US med elbow 1 2 8m 8m 8m 8m 8m 8m       CP post  5m 5m 5m 5m 5m

## 2019-10-08 ENCOUNTER — EVALUATION (OUTPATIENT)
Dept: OCCUPATIONAL THERAPY | Facility: CLINIC | Age: 54
End: 2019-10-08
Payer: COMMERCIAL

## 2019-10-08 DIAGNOSIS — S53.441D TEAR OF ULNAR COLLATERAL LIGAMENT OF RIGHT ELBOW, SUBSEQUENT ENCOUNTER: Primary | ICD-10-CM

## 2019-10-08 DIAGNOSIS — S53.441A TEAR OF ULNAR COLLATERAL LIGAMENT OF RIGHT ELBOW, INITIAL ENCOUNTER: ICD-10-CM

## 2019-10-08 PROCEDURE — 97035 APP MDLTY 1+ULTRASOUND EA 15: CPT | Performed by: OCCUPATIONAL THERAPIST

## 2019-10-08 PROCEDURE — 97140 MANUAL THERAPY 1/> REGIONS: CPT | Performed by: OCCUPATIONAL THERAPIST

## 2019-10-08 PROCEDURE — 97110 THERAPEUTIC EXERCISES: CPT | Performed by: OCCUPATIONAL THERAPIST

## 2019-10-08 PROCEDURE — 97530 THERAPEUTIC ACTIVITIES: CPT | Performed by: OCCUPATIONAL THERAPIST

## 2019-10-08 NOTE — PROGRESS NOTES
Daily Note /REEVAL     Today's date: 10/8/2019  Patient name: Sherman Feldman  : 1965  MRN: [id number]  Referring provider: Lyly Rivas  Dx:   Encounter Diagnosis     ICD-10-CM    1  Tear of ulnar collateral ligament of right elbow, subsequent encounter S53 441D    2  Tear of ulnar collateral ligament of right elbow, initial encounter S53 441A                   Subjective: I want to ride my bike       Objective: See treatment diary below        Assessment  Assessment details: Jeanna Lucio has been treated  s/p UCL R elbow tear   She has regained ROM of her elbow   She has improved  strength   She has improved tenderness in the medial elbow   She no longer has  ulnar parasthesias   She is limited with use of mountain biking   She is unable to push off a surface   She has assistance from her   She is unable to bike or work out    Impairments:   impaired physical strength,  pain with function  Functional limitations: unable to lift , carry , weight bear Understanding of Dx/Px/POC: good   Prognosis: good    Goals  STG- 1  Wk  1- I with HEP - met  2-improve pain 25%- met  3-Improve ROM 25%- met     LTG- 8 wks  1- regain full elbow ROM- met   2-improve  strength = to L- met  3-I ADL- met   4-resume biking , working out - not met    Plan  Plan details: Next MD APPT  19  - ROM  -elbow stabilization   -edema control  -ktape   Patient would benefit from:   skilled occupational therapy  Planned modality interventions: ultrasound, cryotherapy and thermotherapy: hydrocollator packs  Planned therapy interventions: manual therapy, massage, activity modification, stretching, strengthening, home exercise program and functional ROM exercises  Frequency: 2x week  Duration in weeks: 8  Plan of Care beginning date: 10/8/18  Treatment plan discussed with: patient        Subjective Evaluation    History of Present Illness  Date of onset: 2019  Mechanism of injury: trauma  Mechanism of injury: Jeanna Lucio fell mountain biking   She waited a few weeks to be seen   She had an MRI that shows complete UCL tear   Not a recurrent problem   Quality of life: excellent    Pain  Current pain ratin  At best pain ratin  At worst pain ratin  Location: medial elbow and ulnar N dist   Quality: twinge  Relieving factors: rest  Aggravating factors: lifting    Social Support  Steps to enter house: yes  Stairs in house: yes   Lives in: multiple-level home  Lives with: spouse and adult children    Employment status: working  Hand dominance: right    Treatments  Previous treatment: immobilization  Patient Goals  Patient goals for therapy: decreased edema, decreased pain, increased strength, return to sport/leisure activities, independence with ADLs/IADLs and increased motion  Patient goal: resume prior level of function for ADL and recreation         Objective     Palpation     Additional Palpation Details  Tender R UCL      Active Range of Motion     Right Elbow   Flexion:150, previous 145 degrees   Extension: 0 , previous 15 degrees   Forearm supination: 85 degrees   Forearm pronation: 80 degrees     Right Wrist   Normal active range of motion    Additional Active Range of Motion Details  R shoulder ROM WFL     Strength/Myotome Testing           (2nd hand position)     R/L: 57/75    MMT elbow E =5/5   ;F= 5/5           Wrist E=5/5    ; F=5/5           PRO= 5/5  ; SUP=5/5  Tests     Additional Tests Details  UCL not loaded secondary to + MRI     Swelling   Left Elbow Girth Measurements   Joint line: 25 cm    Right Elbow Girth Measurements   Joint line: 25 cm        Plan: Continue per plan of care        Precautions:  UCL tear R elbow        Manual  9/12 9/19 9/24 10/1 10/3 10/8       MOB              MFR flexor/pro  5m 5m 5m 5m 5m 5m       Graston flexor/pronator at elbow 5m 5m 5m 5m 5m 5m                    Ktape flexor/cubital    1m 1m 1m            Exercise Diary  9/12 9/19 9/24 10/1 10/3 10/8       Shoulder ROM 10x 10x 10x 10x 10x          10x 10x 10x 10x 10x        Wrist ROM 10x 10x 10x 10x 10x        Wrist e/f/rd  Elbow 90 4#  3x10   4#  3x10 4#  3x10 4#  3x10 5#  3x10 5#  3x10       Hammer  p/s 3x10 3x10 3x10 3x10 3x10 3x10       flexbar Red   3x10 Red  3x10 Green  3x10 Green  3x10 Green  3x10 Green  3x10       Biceps curl Dowel 8#  3x10 Dowel 8#  3x10 Dowel 10#  3x10 Dowel  14#  3x10 Dowel  14#  3x10 Dowel16#  3x10       UBE 6M  6m 8m 10m 10m 10m       Tband ret Blue   3x10 Blue  3x10 Blue  3x10 Blue  3x10 Blue  3x10 Blue  3x10       Tband shld ext Blue  3x10 Blue  3x10 Blue  3x10 Blue   3x10 Blue  3x10 Blue  3x10       powerweb  Blue 3x10 Blue   3x10 Black  3x10 Black  3x10 Black  3x10 Black  3x10       ER/IR 3#  3x10 3#  3x10  OR  3x10 OR  3x10 OR  3x10                                                                                                  HEP - ROM and prec 4x day                Modalities  9/12 9/19 9/24 10/1 10/3 10/8       cont US med elbow 1 2 8m   8m 8m 8m       MHP   10m                             Manual  7/30 8/1 8/6 8/8 8/13 8/20 8/22 8/29     retrograde 5m 5m 5m 5m 5m 5m 5m 5m     MFR flexor/pro  5m 5m 5m 5m 5m 5m 5m 5m                               Ktape flexor/cubital 2m 2m 2m 2m 2m 2m 2m          Exercise Diary  7/30 8/1 8/6 8/8 8/13 8/20 8/22 8/29     Shoulder ROM 10x 10x 10x 10x 10x 10x 10x 10x     Elbow ROM supine shld 90 10x 10x 10x 10x 10x 10x 10x 10x     Wrist ROM 10x 10x 10x 10x 10x 10x 10x 10x     Wrist e/f/rd  Elbow 90 3x10 2#  3x10 2#  3x10 3#  3x10 3#  3x10 3#  3x10 3#  3x10 4#  3x10     Hammer  p/s 3x10 3x10 3x10 3x10 3x10 3x10 3x10 3x10       ER/PRO on table  2#  3x10 2#  3x10 3#  3x10 3#  3x10 3#  3x10 3#  3x10 4#  3x10     Shoulder raise   2#  3x10 2#  3x10 3#  3x10 3#  3x10 3#  3x10 3#  3x10 4#  3x10      PW      Black  3x10 Black  3x10 Black  3x10     Body blade sagital plane  Overhead/side        2x30 sec HEP - ROM and prec 4x day                Modalities  7/30 8/1 8/6 8/8 8/20 8/22       Cont US med elbow 1 2 8m 8m 8m 8m 8m 8m       CP post  5m 5m 5m 5m 5m

## 2019-10-17 ENCOUNTER — OFFICE VISIT (OUTPATIENT)
Dept: OCCUPATIONAL THERAPY | Facility: CLINIC | Age: 54
End: 2019-10-17
Payer: COMMERCIAL

## 2019-10-17 DIAGNOSIS — S53.441D TEAR OF ULNAR COLLATERAL LIGAMENT OF RIGHT ELBOW, SUBSEQUENT ENCOUNTER: Primary | ICD-10-CM

## 2019-10-17 DIAGNOSIS — S53.441A TEAR OF ULNAR COLLATERAL LIGAMENT OF RIGHT ELBOW, INITIAL ENCOUNTER: ICD-10-CM

## 2019-10-17 PROCEDURE — 97110 THERAPEUTIC EXERCISES: CPT | Performed by: OCCUPATIONAL THERAPIST

## 2019-10-17 PROCEDURE — 97140 MANUAL THERAPY 1/> REGIONS: CPT | Performed by: OCCUPATIONAL THERAPIST

## 2019-10-17 PROCEDURE — 97530 THERAPEUTIC ACTIVITIES: CPT | Performed by: OCCUPATIONAL THERAPIST

## 2019-10-17 NOTE — PROGRESS NOTES
Daily Note     Today's date: 10/17/2019  Patient name: Farida Linn  : 1965  MRN: [id number]  Referring provider: Janette Wong  Dx:   Encounter Diagnosis     ICD-10-CM    1  Tear of ulnar collateral ligament of right elbow, subsequent encounter S53 441D    2  Tear of ulnar collateral ligament of right elbow, initial encounter S53 441A                   Subjective: I was sore for 3 days after last day       Objective: See treatment diary below      Assessment: Tolerated treatment well  Patient has full ROM   She has pain in biceps area  Ktap efor soreness      Plan: Continue per plan of care        Precautions:  UCL tear R elbow        Manual  9/12 9/19 9/24 10/1 10/3 10/8 10/17      MOB              MFR flexor/pro  5m 5m 5m 5m 5m 5m 5m      Graston flexor/pronator at elbow 5m 5m 5m 5m 5m 5m 5m                   Ktape flexor/cubital    1m 1m 1m            Exercise Diary  9/12 9/19 9/24 10/1 10/3 10/8 10/17      Shoulder ROM 10x 10x 10x 10x 10x          10x 10x 10x 10x 10x        Wrist ROM 10x 10x 10x 10x 10x        Wrist e/f/rd  Elbow 90 4#  3x10   4#  3x10 4#  3x10 4#  3x10 5#  3x10 5#  3x10 5#  3x10      Hammer  p/s 3x10 3x10 3x10 3x10 3x10 3x10 3x10      flexbar Red   3x10 Red  3x10 Green  3x10 Green  3x10 Green  3x10 Green  3x10 Green  3x10      Biceps curl Dowel 8#  3x10 Dowel 8#  3x10 Dowel 10#  3x10 Dowel  14#  3x10 Dowel  14#  3x10 Dowel16#  3x10        UBE 6M  6m 8m 10m 10m 10m 10m      Tband ret Blue   3x10 Blue  3x10 Blue  3x10 Blue  3x10 Blue  3x10 Blue  3x10 Blue  3x10      Tband shld ext Blue  3x10 Blue  3x10 Blue  3x10 Blue   3x10 Blue  3x10 Blue  3x10 Blue  3x10      powerweb  Blue 3x10 Blue   3x10 Black  3x10 Black  3x10 Black  3x10 Black  3x10 Black  3x10      ER/IR 3#  3x10 3#  3x10  OR  3x10 OR  3x10 OR  3x10 OR  3x10                                                                                                 HEP - ROM and prec 4x day                Modalities  9/12 9/19 9/24 10/1 10/3 10/8 10/17      cont US med elbow 1 2 8m   8m 8m 8m       MHP   10m    10m                         Manual  7/30 8/1 8/6 8/8 8/13 8/20 8/22 8/29     retrograde 5m 5m 5m 5m 5m 5m 5m 5m     MFR flexor/pro  5m 5m 5m 5m 5m 5m 5m 5m                               Ktape flexor/cubital 2m 2m 2m 2m 2m 2m 2m          Exercise Diary  7/30 8/1 8/6 8/8 8/13 8/20 8/22 8/29     Shoulder ROM 10x 10x 10x 10x 10x 10x 10x 10x     Elbow ROM supine shld 90 10x 10x 10x 10x 10x 10x 10x 10x     Wrist ROM 10x 10x 10x 10x 10x 10x 10x 10x     Wrist e/f/rd  Elbow 90 3x10 2#  3x10 2#  3x10 3#  3x10 3#  3x10 3#  3x10 3#  3x10 4#  3x10     Hammer  p/s 3x10 3x10 3x10 3x10 3x10 3x10 3x10 3x10       ER/PRO on table  2#  3x10 2#  3x10 3#  3x10 3#  3x10 3#  3x10 3#  3x10 4#  3x10     Shoulder raise   2#  3x10 2#  3x10 3#  3x10 3#  3x10 3#  3x10 3#  3x10 4#  3x10      PW      Black  3x10 Black  3x10 Black  3x10     Body blade sagital plane  Overhead/side        2x30 sec                                                                                                                                       HEP - ROM and prec 4x day                Modalities  7/30 8/1 8/6 8/8 8/20 8/22       Cont US med elbow 1 2 8m 8m 8m 8m 8m 8m       CP post  5m 5m 5m 5m 5m

## 2019-10-22 ENCOUNTER — OFFICE VISIT (OUTPATIENT)
Dept: OCCUPATIONAL THERAPY | Facility: CLINIC | Age: 54
End: 2019-10-22
Payer: COMMERCIAL

## 2019-10-22 DIAGNOSIS — S53.441A TEAR OF ULNAR COLLATERAL LIGAMENT OF RIGHT ELBOW, INITIAL ENCOUNTER: ICD-10-CM

## 2019-10-22 DIAGNOSIS — S53.441D TEAR OF ULNAR COLLATERAL LIGAMENT OF RIGHT ELBOW, SUBSEQUENT ENCOUNTER: Primary | ICD-10-CM

## 2019-10-22 PROCEDURE — 97140 MANUAL THERAPY 1/> REGIONS: CPT | Performed by: OCCUPATIONAL THERAPIST

## 2019-10-22 PROCEDURE — 97110 THERAPEUTIC EXERCISES: CPT | Performed by: OCCUPATIONAL THERAPIST

## 2019-10-22 PROCEDURE — 97530 THERAPEUTIC ACTIVITIES: CPT | Performed by: OCCUPATIONAL THERAPIST

## 2019-10-22 PROCEDURE — 97035 APP MDLTY 1+ULTRASOUND EA 15: CPT | Performed by: OCCUPATIONAL THERAPIST

## 2019-10-22 NOTE — PROGRESS NOTES
Daily Note     Today's date: 10/22/2019  Patient name: Farida Linn  : 1965  MRN: [id number]  Referring provider: Janette Wong  Dx:   Encounter Diagnosis     ICD-10-CM    1  Tear of ulnar collateral ligament of right elbow, subsequent encounter S53 441D    2  Tear of ulnar collateral ligament of right elbow, initial encounter S53 441A                   Subjective: I am doing ok       Objective: See treatment diary below      Assessment: Tolerated treatment well  Patient has improved pain and tenderness  Plan: Continue per plan of care        Precautions:  UCL tear R elbow        Manual  9/12 9/19 9/24 10/1 10/3 10/8 10/17 10/22     MOB              MFR flexor/pro  5m 5m 5m 5m 5m 5m 5m 5m     Graston flexor/pronator at elbow 5m 5m 5m 5m 5m 5m 5m 5m                  Ktape flexor/cubital    1m 1m 1m            Exercise Diary  9/12 9/19 9/24 10/1 10/3 10/8 10/17 10/22     Shoulder ROM 10x 10x 10x 10x 10x          10x 10x 10x 10x 10x        Wrist ROM 10x 10x 10x 10x 10x        Wrist e/f/rd  Elbow 90 4#  3x10   4#  3x10 4#  3x10 4#  3x10 5#  3x10 5#  3x10 5#  3x10 5#  3x10     Hammer  p/s 3x10 3x10 3x10 3x10 3x10 3x10 3x10      flexbar Red   3x10 Red  3x10 Green  3x10 Green  3x10 Green  3x10 Green  3x10 Green  3x10 Green  3x10     Biceps curl Dowel 8#  3x10 Dowel 8#  3x10 Dowel 10#  3x10 Dowel  14#  3x10 Dowel  14#  3x10 Dowel16#  3x10   Dumb  Bell  6#  neutral FA     UBE 6M  6m 8m 10m 10m 10m 10m 10m     Tband ret Blue   3x10 Blue  3x10 Blue  3x10 Blue  3x10 Blue  3x10 Blue  3x10 Blue  3x10 Blue  3x10     Tband shld ext Blue  3x10 Blue  3x10 Blue  3x10 Blue   3x10 Blue  3x10 Blue  3x10 Blue  3x10 Blue  3x10     powerweb  Blue 3x10 Blue   3x10 Black  3x10 Black  3x10 Black  3x10 Black  3x10 Black  3x10 Black  3x10     ER/IR 3#  3x10 3#  3x10  OR  3x10 OR  3x10 OR  3x10 OR  3x10 OR  3x10                                                                                                HEP - ROM and prec 4x day Modalities  9/12 9/19 9/24 10/1 10/3 10/8 10/17 10/22     cont US med elbow 1 2 8m   8m 8m 8m  8m     MHP   10m    10m                         Manual  7/30 8/1 8/6 8/8 8/13 8/20 8/22 8/29     retrograde 5m 5m 5m 5m 5m 5m 5m 5m     MFR flexor/pro  5m 5m 5m 5m 5m 5m 5m 5m                               Ktape flexor/cubital 2m 2m 2m 2m 2m 2m 2m          Exercise Diary  7/30 8/1 8/6 8/8 8/13 8/20 8/22 8/29     Shoulder ROM 10x 10x 10x 10x 10x 10x 10x 10x     Elbow ROM supine shld 90 10x 10x 10x 10x 10x 10x 10x 10x     Wrist ROM 10x 10x 10x 10x 10x 10x 10x 10x     Wrist e/f/rd  Elbow 90 3x10 2#  3x10 2#  3x10 3#  3x10 3#  3x10 3#  3x10 3#  3x10 4#  3x10     Hammer  p/s 3x10 3x10 3x10 3x10 3x10 3x10 3x10 3x10       ER/PRO on table  2#  3x10 2#  3x10 3#  3x10 3#  3x10 3#  3x10 3#  3x10 4#  3x10     Shoulder raise   2#  3x10 2#  3x10 3#  3x10 3#  3x10 3#  3x10 3#  3x10 4#  3x10      PW      Black  3x10 Black  3x10 Black  3x10     Body blade sagital plane  Overhead/side        2x30 sec                                                                                                                                       HEP - ROM and prec 4x day                Modalities  7/30 8/1 8/6 8/8 8/20 8/22       Cont US med elbow 1 2 8m 8m 8m 8m 8m 8m       CP post  5m 5m 5m 5m 5m

## 2019-10-24 ENCOUNTER — OFFICE VISIT (OUTPATIENT)
Dept: OCCUPATIONAL THERAPY | Facility: CLINIC | Age: 54
End: 2019-10-24
Payer: COMMERCIAL

## 2019-10-24 DIAGNOSIS — S53.441D TEAR OF ULNAR COLLATERAL LIGAMENT OF RIGHT ELBOW, SUBSEQUENT ENCOUNTER: ICD-10-CM

## 2019-10-24 DIAGNOSIS — S53.441A TEAR OF ULNAR COLLATERAL LIGAMENT OF RIGHT ELBOW, INITIAL ENCOUNTER: Primary | ICD-10-CM

## 2019-10-24 PROCEDURE — 97530 THERAPEUTIC ACTIVITIES: CPT | Performed by: OCCUPATIONAL THERAPIST

## 2019-10-24 PROCEDURE — 97110 THERAPEUTIC EXERCISES: CPT | Performed by: OCCUPATIONAL THERAPIST

## 2019-10-24 PROCEDURE — 97140 MANUAL THERAPY 1/> REGIONS: CPT | Performed by: OCCUPATIONAL THERAPIST

## 2019-10-24 PROCEDURE — 97035 APP MDLTY 1+ULTRASOUND EA 15: CPT | Performed by: OCCUPATIONAL THERAPIST

## 2019-10-24 NOTE — PROGRESS NOTES
Daily Note     Today's date: 10/24/2019  Patient name: Christopher Dueñas  : 1965  MRN: [id number]  Referring provider: Eliel Hope  Dx:   Encounter Diagnosis     ICD-10-CM    1  Tear of ulnar collateral ligament of right elbow, initial encounter S53 441A    2  Tear of ulnar collateral ligament of right elbow, subsequent encounter S53 441D                   Subjective: I have some soreness  I am doing more      Objective: See treatment diary below      Assessment: Tolerated treatment well  Patient has full ROM   Her strength is improving   She has some soreness post activity  Plan: Continue per plan of care        Precautions:  UCL tear R elbow        Manual  9/12 9/19 9/24 10/1 10/3 10/8 10/17 10/22 10/24    MOB              MFR flexor/pro  5m 5m 5m 5m 5m 5m 5m 5m 5m    Graston flexor/pronator at elbow 5m 5m 5m 5m 5m 5m 5m 5m 5m                 Ktape flexor/cubital    1m 1m 1m            Exercise Diary  9/12 9/19 9/24 10/1 10/3 10/8 10/17 10/22 10/24    Shoulder ROM 10x 10x 10x 10x 10x          10x 10x 10x 10x 10x        Wrist ROM 10x 10x 10x 10x 10x        Wrist e/f/rd  Elbow 90 4#  3x10   4#  3x10 4#  3x10 4#  3x10 5#  3x10 5#  3x10 5#  3x10 5#  3x10 5#  3x10    Hammer  p/s 3x10 3x10 3x10 3x10 3x10 3x10 3x10      flexbar Red   3x10 Red  3x10 Green  3x10 Green  3x10 Green  3x10 Green  3x10 Green  3x10 Green  3x10 Green  3x10    Biceps curl Dowel 8#  3x10 Dowel 8#  3x10 Dowel 10#  3x10 Dowel  14#  3x10 Dowel  14#  3x10 Dowel16#  3x10   Dumb  Johnson  6#  neutral FA Dumb bell  6#  Neutra  FA  3x10    UBE 6M  6m 8m 10m 10m 10m 10m 10m 10m    Tband ret Blue   3x10 Blue  3x10 Blue  3x10 Blue  3x10 Blue  3x10 Blue  3x10 Blue  3x10 Blue  3x10 Blue  3x10    Tband shld ext Blue  3x10 Blue  3x10 Blue  3x10 Blue   3x10 Blue  3x10 Blue  3x10 Blue  3x10 Blue  3x10 Blue  3x10    powerweb  Blue 3x10 Blue   3x10 Black  3x10 Black  3x10 Black  3x10 Black  3x10 Black  3x10 Black  3x10 Black  3x10    ER/IR 3#  3x10 3#  3x10 OR  3x10 OR  3x10 OR  3x10 OR  3x10 OR  3x10 OR  3x10                                                                                               HEP - ROM and prec 4x day                Modalities  9/12 9/19 9/24 10/1 10/3 10/8 10/17 10/22 10/24    cont US med elbow 1 2 8m   8m 8m 8m  8m 8m    MHP   10m    10m      CP post         5m          Manual  7/30 8/1 8/6 8/8 8/13 8/20 8/22 8/29     retrograde 5m 5m 5m 5m 5m 5m 5m 5m     MFR flexor/pro  5m 5m 5m 5m 5m 5m 5m 5m                               Ktape flexor/cubital 2m 2m 2m 2m 2m 2m 2m          Exercise Diary  7/30 8/1 8/6 8/8 8/13 8/20 8/22 8/29     Shoulder ROM 10x 10x 10x 10x 10x 10x 10x 10x     Elbow ROM supine shld 90 10x 10x 10x 10x 10x 10x 10x 10x     Wrist ROM 10x 10x 10x 10x 10x 10x 10x 10x     Wrist e/f/rd  Elbow 90 3x10 2#  3x10 2#  3x10 3#  3x10 3#  3x10 3#  3x10 3#  3x10 4#  3x10     Hammer  p/s 3x10 3x10 3x10 3x10 3x10 3x10 3x10 3x10       ER/PRO on table  2#  3x10 2#  3x10 3#  3x10 3#  3x10 3#  3x10 3#  3x10 4#  3x10     Shoulder raise   2#  3x10 2#  3x10 3#  3x10 3#  3x10 3#  3x10 3#  3x10 4#  3x10      PW      Black  3x10 Black  3x10 Black  3x10     Body blade sagital plane  Overhead/side        2x30 sec                                                                                                                                       HEP - ROM and prec 4x day                Modalities  7/30 8/1 8/6 8/8 8/20 8/22       Cont US med elbow 1 2 8m 8m 8m 8m 8m 8m       CP post  5m 5m 5m 5m 5m

## 2019-11-11 ENCOUNTER — OFFICE VISIT (OUTPATIENT)
Dept: OBGYN CLINIC | Facility: CLINIC | Age: 54
End: 2019-11-11
Payer: COMMERCIAL

## 2019-11-11 VITALS
BODY MASS INDEX: 25.77 KG/M2 | DIASTOLIC BLOOD PRESSURE: 72 MMHG | SYSTOLIC BLOOD PRESSURE: 108 MMHG | HEIGHT: 67 IN | WEIGHT: 164.2 LBS

## 2019-11-11 DIAGNOSIS — S53.441D TEAR OF ULNAR COLLATERAL LIGAMENT OF RIGHT ELBOW, SUBSEQUENT ENCOUNTER: Primary | ICD-10-CM

## 2019-11-11 PROCEDURE — 99213 OFFICE O/P EST LOW 20 MIN: CPT | Performed by: ORTHOPAEDIC SURGERY

## 2019-11-11 NOTE — PROGRESS NOTES
ASSESSMENT/PLAN:    Assessment:   Right elbow elbow UCL tear being managed conservatively    Plan:   Resume activities as tolerated and NSAIDs    Follow Up:  PRN      _____________________________________________________  CHIEF COMPLAINT:  Chief Complaint   Patient presents with    Right Elbow - Follow-up         SUBJECTIVE:  Christopher Dueñas is a 49 y o  female who presents for follow up regarding her left elbow LUCL tear wich has been managed conservatively  Patient states that she is having very little discomfort or dysfunction with the right elbow, though she does note that she will occasionally get a pain in the elbow with unique activities such as opening a heavy door  She has been to therapy a few times for strengthening of the elbow, though she feels that she would continue to benefit from some strengthening exercises  She is very eager to get back to mountain biking  No other complaints this time  PAST MEDICAL HISTORY:  Past Medical History:   Diagnosis Date    Plantar fasciitis     Last assessed 2015        PAST SURGICAL HISTORY:  Past Surgical History:   Procedure Laterality Date    DILATION AND CURETTAGE OF UTERUS      INDUCED       Surgical treatment of spontaneous        FAMILY HISTORY:  Family History   Problem Relation Age of Onset    No Known Problems Mother     No Known Problems Father        SOCIAL HISTORY:  Social History     Tobacco Use    Smoking status: Never Smoker    Smokeless tobacco: Never Used   Substance Use Topics    Alcohol use: Never     Frequency: Never    Drug use: Never       MEDICATIONS:  No current outpatient medications on file  ALLERGIES:  No Known Allergies    REVIEW OF SYSTEMS:  Pertinent items are noted in HPI  A comprehensive review of systems was negative      LABS:  HgA1c: No results found for: HGBA1C  BMP: No results found for: GLUCOSE, CALCIUM, NA, K, CO2, CL, BUN, CREATININE        _____________________________________________________  PHYSICAL EXAMINATION:  Vital signs: /72   Ht 5' 7" (1 702 m)   Wt 74 5 kg (164 lb 3 2 oz)   BMI 25 72 kg/m²   General: well developed and well nourished, alert, oriented times 3 and appears comfortable  Psychiatric: Normal  HEENT: Trachea Midline, No torticollis  Cardiovascular: No discernable arrhythmia  Pulmonary: No wheezing or stridor  Skin: No masses, erythema, lacerations, fluctation, ulcerations  Neurovascular: Sensation Intact to the Median, Ulnar, Radial Nerve, Motor Intact to the Median, Ulnar, Radial Nerve and Pulses Intact    MUSCULOSKELETAL EXAMINATION:  Right elbow  · No tenderness to the medial or lateral epicondyles  · Negative posterior drawer  · Stable to varus and valgus stress at full extension and mid flexion  · Negative push-up test  · No mechanical locking with the flexion arc or supination-pronation  · Limb warm and well-perfused    _____________________________________________________  STUDIES REVIEWED:  No Studies to review      PROCEDURES PERFORMED:  Procedures  No Procedures performed today     I interviewed, took the history and examined the patient  I discuss the case with the resident and reviewed the resident's note  I supervised the resident and I agree with the resident management plan as it was presented to me  I was present in the clinic and examined the patient

## 2019-11-13 ENCOUNTER — OFFICE VISIT (OUTPATIENT)
Dept: OCCUPATIONAL THERAPY | Facility: CLINIC | Age: 54
End: 2019-11-13
Payer: COMMERCIAL

## 2019-11-13 DIAGNOSIS — S53.441D TEAR OF ULNAR COLLATERAL LIGAMENT OF RIGHT ELBOW, SUBSEQUENT ENCOUNTER: ICD-10-CM

## 2019-11-13 DIAGNOSIS — S53.441A TEAR OF ULNAR COLLATERAL LIGAMENT OF RIGHT ELBOW, INITIAL ENCOUNTER: Primary | ICD-10-CM

## 2019-11-13 PROCEDURE — 97530 THERAPEUTIC ACTIVITIES: CPT | Performed by: OCCUPATIONAL THERAPIST

## 2019-11-13 PROCEDURE — 97140 MANUAL THERAPY 1/> REGIONS: CPT | Performed by: OCCUPATIONAL THERAPIST

## 2019-11-13 PROCEDURE — 97110 THERAPEUTIC EXERCISES: CPT | Performed by: OCCUPATIONAL THERAPIST

## 2019-11-13 NOTE — PROGRESS NOTES
Daily Note     Today's date: 2019  Patient name: Tio Stricklnad  : 1965  MRN: [id number]  Referring provider: Italo Trujillo  Dx:   Encounter Diagnosis     ICD-10-CM    1  Tear of ulnar collateral ligament of right elbow, initial encounter S53 441A    2  Tear of ulnar collateral ligament of right elbow, subsequent encounter S53 441D                   Subjective: I have some soreness  Dr Checo Sevilla said I can ride my bike      Objective: See treatment diary below      Assessment: Tolerated treatment well  Patient is making steady gains for strength       Plan: Continue per plan of care        Precautions:  UCL tear R elbow        Manual  9/12 9/19 9/24 10/1 10/3 10/8 10/17 10/22 10/24 11/13   MOB              MFR flexor/pro  5m 5m 5m 5m 5m 5m 5m 5m 5m 4m   Graston flexor/pronator at elbow 5m 5m 5m 5m 5m 5m 5m 5m 5m 4m                Ktape flexor/cubital    1m 1m 1m            Exercise Diary  9/12 9/19 9/24 10/1 10/3 10/8 10/17 10/22 10/24 11/13   Shoulder ROM 10x 10x 10x 10x 10x          10x 10x 10x 10x 10x        Wrist ROM 10x 10x 10x 10x 10x        Wrist e/f/rd  Elbow 90 4#  3x10   4#  3x10 4#  3x10 4#  3x10 5#  3x10 5#  3x10 5#  3x10 5#  3x10 5#  3x10 5#  3x10   Hammer  p/s 3x10 3x10 3x10 3x10 3x10 3x10 3x10      flexbar Red   3x10 Red  3x10 Green  3x10 Green  3x10 Green  3x10 Green  3x10 Green  3x10 Green  3x10 Green  3x10    Biceps curl Dowel 8#  3x10 Dowel 8#  3x10 Dowel 10#  3x10 Dowel  14#  3x10 Dowel  14#  3x10 Dowel16#  3x10   Dumb  Johnson  6#  neutral FA Dumb bell  6#  Neutra  FA  3x10 7#  3x10   UBE 6M  6m 8m 10m 10m 10m 10m 10m 10m 10m   Tband ret Blue   3x10 Blue  3x10 Blue  3x10 Blue  3x10 Blue  3x10 Blue  3x10 Blue  3x10 Blue  3x10 Blue  3x10 Blue  3x10   Tband shld ext Blue  3x10 Blue  3x10 Blue  3x10 Blue   3x10 Blue  3x10 Blue  3x10 Blue  3x10 Blue  3x10 Blue  3x10    powerweb  Blue 3x10 Blue   3x10 Black  3x10 Black  3x10 Black  3x10 Black  3x10 Black  3x10 Black  3x10 Black  3x10 Black 3x10   ER/IR 3#  3x10 3#  3x10  OR  3x10 OR  3x10 OR  3x10 OR  3x10 OR  3x10 OR  3x10    Row cable          25#  3x10     Press cable          20#  3x10   Shoulder stab cable          35#  3x10   Ball on trampoline           4#  3x10                                          HEP - ROM and prec 4x day                Modalities  9/12 9/19 9/24 10/1 10/3 10/8 10/17 10/22 10/24    cont US med elbow 1 2 8m   8m 8m 8m  8m 8m    MHP   10m    10m      CP post         5m          Manual  7/30 8/1 8/6 8/8 8/13 8/20 8/22 8/29     retrograde 5m 5m 5m 5m 5m 5m 5m 5m     MFR flexor/pro  5m 5m 5m 5m 5m 5m 5m 5m                               Ktape flexor/cubital 2m 2m 2m 2m 2m 2m 2m          Exercise Diary  7/30 8/1 8/6 8/8 8/13 8/20 8/22 8/29     Shoulder ROM 10x 10x 10x 10x 10x 10x 10x 10x     Elbow ROM supine shld 90 10x 10x 10x 10x 10x 10x 10x 10x     Wrist ROM 10x 10x 10x 10x 10x 10x 10x 10x     Wrist e/f/rd  Elbow 90 3x10 2#  3x10 2#  3x10 3#  3x10 3#  3x10 3#  3x10 3#  3x10 4#  3x10     Hammer  p/s 3x10 3x10 3x10 3x10 3x10 3x10 3x10 3x10       ER/PRO on table  2#  3x10 2#  3x10 3#  3x10 3#  3x10 3#  3x10 3#  3x10 4#  3x10     Shoulder raise   2#  3x10 2#  3x10 3#  3x10 3#  3x10 3#  3x10 3#  3x10 4#  3x10      PW      Black  3x10 Black  3x10 Black  3x10     Body blade sagital plane  Overhead/side        2x30 sec                                                                                                                                       HEP - ROM and prec 4x day                Modalities  7/30 8/1 8/6 8/8 8/20 8/22       Cont US med elbow 1 2 8m 8m 8m 8m 8m 8m       CP post  5m 5m 5m 5m 5m

## 2019-11-19 ENCOUNTER — OFFICE VISIT (OUTPATIENT)
Dept: OCCUPATIONAL THERAPY | Facility: CLINIC | Age: 54
End: 2019-11-19
Payer: COMMERCIAL

## 2019-11-19 DIAGNOSIS — S53.441A TEAR OF ULNAR COLLATERAL LIGAMENT OF RIGHT ELBOW, INITIAL ENCOUNTER: Primary | ICD-10-CM

## 2019-11-19 DIAGNOSIS — S53.441D TEAR OF ULNAR COLLATERAL LIGAMENT OF RIGHT ELBOW, SUBSEQUENT ENCOUNTER: ICD-10-CM

## 2019-11-19 PROCEDURE — 97530 THERAPEUTIC ACTIVITIES: CPT | Performed by: OCCUPATIONAL THERAPIST

## 2019-11-19 PROCEDURE — 97110 THERAPEUTIC EXERCISES: CPT | Performed by: OCCUPATIONAL THERAPIST

## 2019-11-19 NOTE — PROGRESS NOTES
Daily Note     Today's date: 2019  Patient name: Ap Ballesteros  : 1965  MRN: [id number]  Referring provider: Elsy Streeter  Dx:   Encounter Diagnosis     ICD-10-CM    1  Tear of ulnar collateral ligament of right elbow, initial encounter S53 441A    2  Tear of ulnar collateral ligament of right elbow, subsequent encounter S53 441D                   Subjective: I am doing better      Objective: See treatment diary below      Assessment: Tolerated treatment well  Patient  has improved strength   She has fatigue post tx    2 R/L= 75/79      Plan: Continue per plan of care        Precautions:  UCL tear R elbow        Manual              MOB              MFR flexor/pro   4m            Graston flexor/pronator at elbow                           Ktape flexor/cubital                 Exercise Diary              Shoulder ROM 10x              10x            Wrist ROM 10x            Wrist e/f/rd    7#  3x10              Hammer  p/s 3x10            flexbar Red   3x10            Biceps curl Dowel 8#  3x10            UBE 10M             Cable  ret 55#   3x10            Cable  shld ext 35#  3x10            powerweb  black 3x10            ER/IR/pro 5#  3x10            Cable chest press 20#  3x10            flexbar p/s Blue  3x10            Biceps curls 12#  3x10            Shoulder flex to 90 5#  3x10                                                   HEP - ROM and prec 4x day                Modalities              cont US med elbow 1 2                                                Manual  9/12 9/19 9/24 10/1 10/3 10/8 10/17 10/22 10/24 11/13   MOB              MFR flexor/pro  5m 5m 5m 5m 5m 5m 5m 5m 5m 4m   Graston flexor/pronator at elbow 5m 5m 5m 5m 5m 5m 5m 5m 5m 4m                Ktape flexor/cubital    1m 1m 1m            Exercise Diary  9/12 9/19 9/24 10/1 10/3 10/8 10/17 10/22 10/24 11/13   Shoulder ROM 10x 10x 10x 10x 10x          10x 10x 10x 10x 10x        Wrist ROM 10x 10x 10x 10x 10x Wrist e/f/rd  Elbow 90 4#  3x10   4#  3x10 4#  3x10 4#  3x10 5#  3x10 5#  3x10 5#  3x10 5#  3x10 5#  3x10 5#  3x10   Hammer  p/s 3x10 3x10 3x10 3x10 3x10 3x10 3x10      flexbar Red   3x10 Red  3x10 Green  3x10 Green  3x10 Green  3x10 Green  3x10 Green  3x10 Green  3x10 Green  3x10    Biceps curl Dowel 8#  3x10 Dowel 8#  3x10 Dowel 10#  3x10 Dowel  14#  3x10 Dowel  14#  3x10 Dowel16#  3x10   Dumb  Johnson  6#  neutral FA Dumb bell  6#  Neutra  FA  3x10 7#  3x10   UBE 6M  6m 8m 10m 10m 10m 10m 10m 10m 10m   Tband ret Blue   3x10 Blue  3x10 Blue  3x10 Blue  3x10 Blue  3x10 Blue  3x10 Blue  3x10 Blue  3x10 Blue  3x10 Blue  3x10   Tband shld ext Blue  3x10 Blue  3x10 Blue  3x10 Blue   3x10 Blue  3x10 Blue  3x10 Blue  3x10 Blue  3x10 Blue  3x10    powerweb  Blue 3x10 Blue   3x10 Black  3x10 Black  3x10 Black  3x10 Black  3x10 Black  3x10 Black  3x10 Black  3x10 Black 3x10   ER/IR 3#  3x10 3#  3x10  OR  3x10 OR  3x10 OR  3x10 OR  3x10 OR  3x10 OR  3x10    Row cable          25#  3x10     Press cable          20#  3x10   Shoulder stab cable          35#  3x10   Ball on trampoline           4#  3x10                                          HEP - ROM and prec 4x day                Modalities  9/12 9/19 9/24 10/1 10/3 10/8 10/17 10/22 10/24    cont US med elbow 1 2 8m   8m 8m 8m  8m 8m    MHP   10m    10m      CP post         5m          Manual  7/30 8/1 8/6 8/8 8/13 8/20 8/22 8/29     retrograde 5m 5m 5m 5m 5m 5m 5m 5m     MFR flexor/pro  5m 5m 5m 5m 5m 5m 5m 5m                               Ktape flexor/cubital 2m 2m 2m 2m 2m 2m 2m          Exercise Diary  7/30 8/1 8/6 8/8 8/13 8/20 8/22 8/29     Shoulder ROM 10x 10x 10x 10x 10x 10x 10x 10x     Elbow ROM supine shld 90 10x 10x 10x 10x 10x 10x 10x 10x     Wrist ROM 10x 10x 10x 10x 10x 10x 10x 10x     Wrist e/f/rd  Elbow 90 3x10 2#  3x10 2#  3x10 3#  3x10 3#  3x10 3#  3x10 3#  3x10 4#  3x10     Hammer  p/s 3x10 3x10 3x10 3x10 3x10 3x10 3x10 3x10       ER/PRO on table 2#  3x10 2#  3x10 3#  3x10 3#  3x10 3#  3x10 3#  3x10 4#  3x10     Shoulder raise   2#  3x10 2#  3x10 3#  3x10 3#  3x10 3#  3x10 3#  3x10 4#  3x10      PW      Black  3x10 Black  3x10 Black  3x10     Body blade sagital plane  Overhead/side        2x30 sec                                                                                                                                       HEP - ROM and prec 4x day                Modalities  7/30 8/1 8/6 8/8 8/20 8/22       Cont US med elbow 1 2 8m 8m 8m 8m 8m 8m       CP post  5m 5m 5m 5m 5m

## 2019-11-27 ENCOUNTER — OFFICE VISIT (OUTPATIENT)
Dept: OCCUPATIONAL THERAPY | Facility: CLINIC | Age: 54
End: 2019-11-27
Payer: COMMERCIAL

## 2019-11-27 DIAGNOSIS — S53.441D TEAR OF ULNAR COLLATERAL LIGAMENT OF RIGHT ELBOW, SUBSEQUENT ENCOUNTER: ICD-10-CM

## 2019-11-27 DIAGNOSIS — S53.441A TEAR OF ULNAR COLLATERAL LIGAMENT OF RIGHT ELBOW, INITIAL ENCOUNTER: Primary | ICD-10-CM

## 2019-11-27 PROCEDURE — 97530 THERAPEUTIC ACTIVITIES: CPT | Performed by: OCCUPATIONAL THERAPIST

## 2019-11-27 PROCEDURE — 97110 THERAPEUTIC EXERCISES: CPT | Performed by: OCCUPATIONAL THERAPIST

## 2019-11-27 NOTE — PROGRESS NOTES
Daily Note     Today's date: 2019  Patient name: Zuleima Gamez  : 1965  MRN: [id number]  Referring provider: Jammie Adams  Dx:   Encounter Diagnosis     ICD-10-CM    1  Tear of ulnar collateral ligament of right elbow, initial encounter S53 441A    2  Tear of ulnar collateral ligament of right elbow, subsequent encounter S53 441D                   Subjective: I am feeling better      Objective: See treatment diary below      Assessment: Tolerated treatment well  Patient has imporved strength with good carryover      Plan: Continue per plan of care        Precautions:  UCL tear R elbow        Manual             MOB              MFR flexor/pro   4m 4m           Graston flexor/pronator at elbow                           Ktape flexor/cubital                 Exercise Diary             Shoulder ROM 10x 10x             10x 10x           Wrist ROM 10x 10x           Wrist e/f/rd    7#  3x10   7#  3x10           Hammer  p/s 3x10            flexbar Red   3x10 Green  3x10           Biceps curl Dowel 8#  3x10             UBE 10M  10m           Cable  ret 55#   3x10 55#  3x10           Cable  shld ext 35#  3x10 35#  3x10           powerweb  black 3x10            ER/IR/pro 5#  3x10 5#  3x10           Cable chest press 20#  3x10 20#  3x10           flexbar p/s Blue  3x10 Blue  3x10           Biceps curls 12#  3x10 12#  2x10           Shoulder flex to 90 5#  3x10 5#  3x10                                                  HEP - ROM and prec 4x day                Modalities             cont US med elbow 1 2                                                Manual  9/12 9/19 9/24 10/1 10/3 10/8 10/17 10/22 10/24 11/13   MOB              MFR flexor/pro  5m 5m 5m 5m 5m 5m 5m 5m 5m 4m   Graston flexor/pronator at elbow 5m 5m 5m 5m 5m 5m 5m 5m 5m 4m                Ktape flexor/cubital    1m 1m 1m            Exercise Diary  9/12 9/19 9/24 10/1 10/3 10/8 10/17 10/22 10/24 11/13   Shoulder ROM 10x 10x 10x 10x 10x          10x 10x 10x 10x 10x        Wrist ROM 10x 10x 10x 10x 10x        Wrist e/f/rd  Elbow 90 4#  3x10   4#  3x10 4#  3x10 4#  3x10 5#  3x10 5#  3x10 5#  3x10 5#  3x10 5#  3x10 5#  3x10   Hammer  p/s 3x10 3x10 3x10 3x10 3x10 3x10 3x10      flexbar Red   3x10 Red  3x10 Green  3x10 Green  3x10 Green  3x10 Green  3x10 Green  3x10 Green  3x10 Green  3x10    Biceps curl Dowel 8#  3x10 Dowel 8#  3x10 Dowel 10#  3x10 Dowel  14#  3x10 Dowel  14#  3x10 Dowel16#  3x10   Dumb  Johnson  6#  neutral FA Dumb bell  6#  Neutra  FA  3x10 7#  3x10   UBE 6M  6m 8m 10m 10m 10m 10m 10m 10m 10m   Tband ret Blue   3x10 Blue  3x10 Blue  3x10 Blue  3x10 Blue  3x10 Blue  3x10 Blue  3x10 Blue  3x10 Blue  3x10 Blue  3x10   Tband shld ext Blue  3x10 Blue  3x10 Blue  3x10 Blue   3x10 Blue  3x10 Blue  3x10 Blue  3x10 Blue  3x10 Blue  3x10    powerweb  Blue 3x10 Blue   3x10 Black  3x10 Black  3x10 Black  3x10 Black  3x10 Black  3x10 Black  3x10 Black  3x10 Black 3x10   ER/IR 3#  3x10 3#  3x10  OR  3x10 OR  3x10 OR  3x10 OR  3x10 OR  3x10 OR  3x10    Row cable          25#  3x10     Press cable          20#  3x10   Shoulder stab cable          35#  3x10   Ball on trampoline           4#  3x10                                          HEP - ROM and prec 4x day                Modalities  9/12 9/19 9/24 10/1 10/3 10/8 10/17 10/22 10/24    cont US med elbow 1 2 8m   8m 8m 8m  8m 8m    MHP   10m    10m      CP post         5m          Manual  7/30 8/1 8/6 8/8 8/13 8/20 8/22 8/29     retrograde 5m 5m 5m 5m 5m 5m 5m 5m     MFR flexor/pro  5m 5m 5m 5m 5m 5m 5m 5m                               Ktape flexor/cubital 2m 2m 2m 2m 2m 2m 2m          Exercise Diary  7/30 8/1 8/6 8/8 8/13 8/20 8/22 8/29     Shoulder ROM 10x 10x 10x 10x 10x 10x 10x 10x     Elbow ROM supine shld 90 10x 10x 10x 10x 10x 10x 10x 10x     Wrist ROM 10x 10x 10x 10x 10x 10x 10x 10x     Wrist e/f/rd  Elbow 90 3x10 2#  3x10 2#  3x10 3#  3x10 3#  3x10 3#  3x10 3#  3x10 4#  3x10     Hammer  p/s 3x10 3x10 3x10 3x10 3x10 3x10 3x10 3x10       ER/PRO on table  2#  3x10 2#  3x10 3#  3x10 3#  3x10 3#  3x10 3#  3x10 4#  3x10     Shoulder raise   2#  3x10 2#  3x10 3#  3x10 3#  3x10 3#  3x10 3#  3x10 4#  3x10      PW      Black  3x10 Black  3x10 Black  3x10     Body blade sagital plane  Overhead/side        2x30 sec                                                                                                                                       HEP - ROM and prec 4x day                Modalities  7/30 8/1 8/6 8/8 8/20 8/22       Cont US med elbow 1 2 8m 8m 8m 8m 8m 8m       CP post  5m 5m 5m 5m 5m

## 2019-12-17 ENCOUNTER — OFFICE VISIT (OUTPATIENT)
Dept: OCCUPATIONAL THERAPY | Facility: CLINIC | Age: 54
End: 2019-12-17
Payer: COMMERCIAL

## 2019-12-17 DIAGNOSIS — S53.441D TEAR OF ULNAR COLLATERAL LIGAMENT OF RIGHT ELBOW, SUBSEQUENT ENCOUNTER: ICD-10-CM

## 2019-12-17 DIAGNOSIS — S53.441A TEAR OF ULNAR COLLATERAL LIGAMENT OF RIGHT ELBOW, INITIAL ENCOUNTER: Primary | ICD-10-CM

## 2019-12-17 PROCEDURE — 97140 MANUAL THERAPY 1/> REGIONS: CPT | Performed by: OCCUPATIONAL THERAPIST

## 2019-12-17 PROCEDURE — 97530 THERAPEUTIC ACTIVITIES: CPT | Performed by: OCCUPATIONAL THERAPIST

## 2019-12-17 PROCEDURE — 97110 THERAPEUTIC EXERCISES: CPT | Performed by: OCCUPATIONAL THERAPIST

## 2019-12-17 NOTE — PROGRESS NOTES
Daily Note     Today's date: 2019  Patient name: Tio Strickland  : 1965  MRN: [id number]  Referring provider: Italo Trujillo  Dx:   Encounter Diagnosis     ICD-10-CM    1  Tear of ulnar collateral ligament of right elbow, initial encounter S53 441A    2  Tear of ulnar collateral ligament of right elbow, subsequent encounter S53 441D                   Subjective: I am feeling better   Occasional  Pain       Objective: See treatment diary below      Assessment: Tolerated treatment well  Patient has full ROM   She has improved strength   Plan: Continue per plan of care        Precautions:  UCL tear R elbow        Manual            MOB              MFR flexor/pro   4m 4m 4m          Graston flexor/pronator at elbow                           Ktape flexor/cubital                 Exercise Diary            Shoulder ROM 10x 10x 10x            10x 10x 10x          Wrist ROM 10x 10x 10x          Wrist e/f/rd    7#  3x10   7#  3x10 7#  2x10          Hammer  p/s 3x10            flexbar Red   3x10 Green  3x10            Biceps curl Dowel 8#  3x10             UBE 10M  10m 10m          Cable  ret 55#   3x10 55#  3x10 55#  3x10          Cable  shld ext 35#  3x10 35#  3x10 35#  3x10          powerweb  black 3x10            ER/IR/pro 5#  3x10 5#  3x10 5#  3x10          Cable chest press 20#  3x10 20#  3x10 25#  3x10          flexbar p/s Blue  3x10 Blue  3x10 Blue  3x10          Biceps curls 12#  3x10 12#  2x10 12#  3x10          Shoulder flex to 90 5#  3x10 5#  3x10 5#  3x10          Triceps cable   45#  3x10                                    HEP - ROM and prec 4x day                Modalities            cont US med elbow 1 2              Cp post   6m                               Manual  9/12 9/19 9/24 10/1 10/3 10/8 10/17 10/22 10/24 11/13   MOB              MFR flexor/pro  5m 5m 5m 5m 5m 5m 5m 5m 5m 4m   Graston flexor/pronator at elbow 5m 5m 5m 5m 5m 5m 5m 5m 5m 4m                Ktape flexor/cubital    1m 1m 1m            Exercise Diary  9/12 9/19 9/24 10/1 10/3 10/8 10/17 10/22 10/24 11/13   Shoulder ROM 10x 10x 10x 10x 10x          10x 10x 10x 10x 10x        Wrist ROM 10x 10x 10x 10x 10x        Wrist e/f/rd  Elbow 90 4#  3x10   4#  3x10 4#  3x10 4#  3x10 5#  3x10 5#  3x10 5#  3x10 5#  3x10 5#  3x10 5#  3x10   Hammer  p/s 3x10 3x10 3x10 3x10 3x10 3x10 3x10      flexbar Red   3x10 Red  3x10 Green  3x10 Green  3x10 Green  3x10 Green  3x10 Green  3x10 Green  3x10 Green  3x10    Biceps curl Dowel 8#  3x10 Dowel 8#  3x10 Dowel 10#  3x10 Dowel  14#  3x10 Dowel  14#  3x10 Dowel16#  3x10   Dumb  Johnson  6#  neutral FA Dumb bell  6#  Neutra  FA  3x10 7#  3x10   UBE 6M  6m 8m 10m 10m 10m 10m 10m 10m 10m   Tband ret Blue   3x10 Blue  3x10 Blue  3x10 Blue  3x10 Blue  3x10 Blue  3x10 Blue  3x10 Blue  3x10 Blue  3x10 Blue  3x10   Tband shld ext Blue  3x10 Blue  3x10 Blue  3x10 Blue   3x10 Blue  3x10 Blue  3x10 Blue  3x10 Blue  3x10 Blue  3x10    powerweb  Blue 3x10 Blue   3x10 Black  3x10 Black  3x10 Black  3x10 Black  3x10 Black  3x10 Black  3x10 Black  3x10 Black 3x10   ER/IR 3#  3x10 3#  3x10  OR  3x10 OR  3x10 OR  3x10 OR  3x10 OR  3x10 OR  3x10    Row cable          25#  3x10     Press cable          20#  3x10   Shoulder stab cable          35#  3x10   Ball on trampoline           4#  3x10                                          HEP - ROM and prec 4x day                Modalities  9/12 9/19 9/24 10/1 10/3 10/8 10/17 10/22 10/24    cont US med elbow 1 2 8m   8m 8m 8m  8m 8m    MHP   10m    10m      CP post         5m          Manual  7/30 8/1 8/6 8/8 8/13 8/20 8/22 8/29     retrograde 5m 5m 5m 5m 5m 5m 5m 5m     MFR flexor/pro  5m 5m 5m 5m 5m 5m 5m 5m                               Ktape flexor/cubital 2m 2m 2m 2m 2m 2m 2m          Exercise Diary  7/30 8/1 8/6 8/8 8/13 8/20 8/22 8/29     Shoulder ROM 10x 10x 10x 10x 10x 10x 10x 10x     Elbow ROM supine shld 90 10x 10x 10x 10x 10x 10x 10x 10x     Wrist ROM 10x 10x 10x 10x 10x 10x 10x 10x     Wrist e/f/rd  Elbow 90 3x10 2#  3x10 2#  3x10 3#  3x10 3#  3x10 3#  3x10 3#  3x10 4#  3x10     Hammer  p/s 3x10 3x10 3x10 3x10 3x10 3x10 3x10 3x10       ER/PRO on table  2#  3x10 2#  3x10 3#  3x10 3#  3x10 3#  3x10 3#  3x10 4#  3x10     Shoulder raise   2#  3x10 2#  3x10 3#  3x10 3#  3x10 3#  3x10 3#  3x10 4#  3x10      PW      Black  3x10 Black  3x10 Black  3x10     Body blade sagital plane  Overhead/side        2x30 sec                                                                                                                                       HEP - ROM and prec 4x day                Modalities  7/30 8/1 8/6 8/8 8/20 8/22       Cont US med elbow 1 2 8m 8m 8m 8m 8m 8m       CP post  5m 5m 5m 5m 5m

## 2019-12-23 ENCOUNTER — OFFICE VISIT (OUTPATIENT)
Dept: OCCUPATIONAL THERAPY | Facility: CLINIC | Age: 54
End: 2019-12-23
Payer: COMMERCIAL

## 2019-12-23 DIAGNOSIS — S53.441A TEAR OF ULNAR COLLATERAL LIGAMENT OF RIGHT ELBOW, INITIAL ENCOUNTER: Primary | ICD-10-CM

## 2019-12-23 DIAGNOSIS — S53.441D TEAR OF ULNAR COLLATERAL LIGAMENT OF RIGHT ELBOW, SUBSEQUENT ENCOUNTER: ICD-10-CM

## 2019-12-23 PROCEDURE — 97110 THERAPEUTIC EXERCISES: CPT | Performed by: OCCUPATIONAL THERAPIST

## 2019-12-23 PROCEDURE — 97530 THERAPEUTIC ACTIVITIES: CPT | Performed by: OCCUPATIONAL THERAPIST

## 2019-12-23 NOTE — PROGRESS NOTES
Daily Note     Today's date: 2019  Patient name: Louie Rodriguez  : 1965  MRN: [id number]  Referring provider: Bulmaro Serrato  Dx:   Encounter Diagnosis     ICD-10-CM    1  Tear of ulnar collateral ligament of right elbow, initial encounter S53 441A    2  Tear of ulnar collateral ligament of right elbow, subsequent encounter S53 441D                   Subjective: it's not 100%      Objective: See treatment diary below      Assessment: Tolerated treatment well  Patient has improved strength   She has occasional shooting pain  Plan: Continue per plan of care        Precautions:  UCL tear R elbow        Manual           MOB              MFR flexor/pro   4m 4m 4m 4m         Graston flexor/pronator at elbow                           Ktape flexor/cubital                 Exercise Diary           Shoulder ROM 10x 10x 10x 10x           10x 10x 10x 10x         Wrist ROM 10x 10x 10x 10x         Wrist e/f/rd    7#  3x10   7#  3x10 7#  2x10 8#  3x19         Hammer  p/s 3x10            flexbar Red   3x10 Green  3x10   Green  3x10         Biceps curl Dowel 8#  3x10             UBE 10M  10m 10m 10m          Cable  ret 55#   3x10 55#  3x10 55#  3x10 55#  3x10         Cable  shld ext 35#  3x10 35#  3x10 35#  3x10 35#  3x10         powerweb  black 3x10            ER/IR/pro 5#  3x10 5#  3x10 5#  3x10 5#  3x10         Cable chest press 20#  3x10 20#  3x10 25#  3x10 25#  3x10         flexbar p/s Blue  3x10 Blue  3x10 Blue  3x10 Blue  3x19         Biceps curls 12#  3x10 12#  2x10 12#  3x10 12#  3x10         Shoulder flex to 90 5#  3x10 5#  3x10 5#  3x10 5#  3x10         Triceps cable   45#  3x10 55#  3x10                                   HEP - ROM and prec 4x day                Modalities            cont US med elbow 1 2              Cp post   6m                               Manual  9/12 9/19 9/24 10/1 10/3 10/8 10/17 10/22 10/24 11/13   MOB              MFR flexor/pro  5m 5m 5m 5m 5m 5m 5m 5m 5m 4m   Graston flexor/pronator at elbow 5m 5m 5m 5m 5m 5m 5m 5m 5m 4m                Ktape flexor/cubital    1m 1m 1m            Exercise Diary  9/12 9/19 9/24 10/1 10/3 10/8 10/17 10/22 10/24 11/13   Shoulder ROM 10x 10x 10x 10x 10x          10x 10x 10x 10x 10x        Wrist ROM 10x 10x 10x 10x 10x        Wrist e/f/rd  Elbow 90 4#  3x10   4#  3x10 4#  3x10 4#  3x10 5#  3x10 5#  3x10 5#  3x10 5#  3x10 5#  3x10 5#  3x10   Hammer  p/s 3x10 3x10 3x10 3x10 3x10 3x10 3x10      flexbar Red   3x10 Red  3x10 Green  3x10 Green  3x10 Green  3x10 Green  3x10 Green  3x10 Green  3x10 Green  3x10    Biceps curl Dowel 8#  3x10 Dowel 8#  3x10 Dowel 10#  3x10 Dowel  14#  3x10 Dowel  14#  3x10 Dowel16#  3x10   Dumb  Johnson  6#  neutral FA Dumb bell  6#  Neutra  FA  3x10 7#  3x10   UBE 6M  6m 8m 10m 10m 10m 10m 10m 10m 10m   Tband ret Blue   3x10 Blue  3x10 Blue  3x10 Blue  3x10 Blue  3x10 Blue  3x10 Blue  3x10 Blue  3x10 Blue  3x10 Blue  3x10   Tband shld ext Blue  3x10 Blue  3x10 Blue  3x10 Blue   3x10 Blue  3x10 Blue  3x10 Blue  3x10 Blue  3x10 Blue  3x10    powerweb  Blue 3x10 Blue   3x10 Black  3x10 Black  3x10 Black  3x10 Black  3x10 Black  3x10 Black  3x10 Black  3x10 Black 3x10   ER/IR 3#  3x10 3#  3x10  OR  3x10 OR  3x10 OR  3x10 OR  3x10 OR  3x10 OR  3x10    Row cable          25#  3x10     Press cable          20#  3x10   Shoulder stab cable          35#  3x10   Ball on trampoline           4#  3x10                                          HEP - ROM and prec 4x day                Modalities  9/12 9/19 9/24 10/1 10/3 10/8 10/17 10/22 10/24    cont US med elbow 1 2 8m   8m 8m 8m  8m 8m    MHP   10m    10m      CP post         5m          Manual  7/30 8/1 8/6 8/8 8/13 8/20 8/22 8/29     retrograde 5m 5m 5m 5m 5m 5m 5m 5m     MFR flexor/pro  5m 5m 5m 5m 5m 5m 5m 5m                               Ktape flexor/cubital 2m 2m 2m 2m 2m 2m 2m          Exercise Diary  7/30 8/1 8/6 8/8 8/13 8/20 8/22 8/29     Shoulder ROM 10x 10x 10x 10x 10x 10x 10x 10x     Elbow ROM supine shld 90 10x 10x 10x 10x 10x 10x 10x 10x     Wrist ROM 10x 10x 10x 10x 10x 10x 10x 10x     Wrist e/f/rd  Elbow 90 3x10 2#  3x10 2#  3x10 3#  3x10 3#  3x10 3#  3x10 3#  3x10 4#  3x10     Hammer  p/s 3x10 3x10 3x10 3x10 3x10 3x10 3x10 3x10       ER/PRO on table  2#  3x10 2#  3x10 3#  3x10 3#  3x10 3#  3x10 3#  3x10 4#  3x10     Shoulder raise   2#  3x10 2#  3x10 3#  3x10 3#  3x10 3#  3x10 3#  3x10 4#  3x10      PW      Black  3x10 Black  3x10 Black  3x10     Body blade sagital plane  Overhead/side        2x30 sec                                                                                                                                       HEP - ROM and prec 4x day                Modalities  7/30 8/1 8/6 8/8 8/20 8/22       Cont US med elbow 1 2 8m 8m 8m 8m 8m 8m       CP post  5m 5m 5m 5m 5m

## 2021-03-31 ENCOUNTER — OFFICE VISIT (OUTPATIENT)
Dept: OBGYN CLINIC | Facility: MEDICAL CENTER | Age: 56
End: 2021-03-31
Payer: COMMERCIAL

## 2021-03-31 ENCOUNTER — APPOINTMENT (OUTPATIENT)
Dept: RADIOLOGY | Facility: MEDICAL CENTER | Age: 56
End: 2021-03-31
Payer: COMMERCIAL

## 2021-03-31 DIAGNOSIS — M75.42 IMPINGEMENT SYNDROME OF LEFT SHOULDER: ICD-10-CM

## 2021-03-31 DIAGNOSIS — M25.512 ACUTE PAIN OF LEFT SHOULDER: ICD-10-CM

## 2021-03-31 DIAGNOSIS — M25.512 CHRONIC LEFT SHOULDER PAIN: Primary | ICD-10-CM

## 2021-03-31 DIAGNOSIS — G89.29 CHRONIC LEFT SHOULDER PAIN: Primary | ICD-10-CM

## 2021-03-31 PROCEDURE — 73030 X-RAY EXAM OF SHOULDER: CPT

## 2021-03-31 PROCEDURE — 99213 OFFICE O/P EST LOW 20 MIN: CPT | Performed by: STUDENT IN AN ORGANIZED HEALTH CARE EDUCATION/TRAINING PROGRAM

## 2021-03-31 NOTE — PATIENT INSTRUCTIONS
You have been diagnosed with Rotator cuff impingement which is a pinching of the tendon, or cable, that attaches to the arm bone known as the humerus on the outside of your shoulder and can lead to pain, feelings of weakness due to pain, and worsening pain with reaching overhead, repetitive movements with your arm, or lying on your shoulder at night  There is no indication today of a significant or large rotator cuff tear requiring surgery although it is possible you may still have a small tear that does not require surgery at the moment  Impingement and even small rotator cuff tear symptoms usually improve with rest, range of motion exercises, and physical therapy over 1-3 months but sometimes can take 4-6 months  Additional treatments include steroid injection for inflammation; however, the pain relief from steroid injections is not long lasting and the curative treatment is physical therapy  If you continue to fail to improve with conservative measures after 3 months or if there is a red flag, then we will discuss obtaining an MRI at future visits to evaluate for severe rotator cuff tear  Surgery for impingement syndrome is usually not considered until after 4-6 months of initial therapy unless a severe rotator cuff tear is found on MRI

## 2021-03-31 NOTE — PROGRESS NOTES
1  Chronic left shoulder pain  XR shoulder 2+ vw left   2  Impingement syndrome of left shoulder       Orders Placed This Encounter   Procedures    XR shoulder 2+ vw left        Imaging Studies (I personally reviewed images in PACS and report): 1  X-ray left shoulder 03/31/2021: No acute osseous abnormalities  IMPRESSION:  Chronic left shoulder pain without precipitating injury  DDx: shoulder impingement syndrome/bursitis, scapular dyskinesia, myofascial pain      PLAN:  The patient has an examination consistent with impingement syndrome of the left shoulder  I have discussed with the patient the pathophysiology of this diagnosis and reviewed how the examination correlates with this diagnosis  Treatment options were discussed at length and after discussing these treatment options, the patient elected to not have a subacromial injection of corticosteroid preferring to use OTC NSAIDs/acetaminophen  In addition, I offered referral to formal PT, but patient declined due to time constraints - agreeable to home exercises which were provided today  I will reevaluate the patient in 6-8 weeks where she potentially may receive a cortisone injection and/or formal PT referral and/or MRI left shoulder w/o contrast for further evaluation  CHIEF COMPLAINT:  Left shoulder pain    HPI:  Heidi Lugo is a 48 y o  right-hand-dominant female  who presents for       Visit 03/31/2021 :  Initial evaluation of left shoulder pain:  Patient reports has been an ongoing issue since November, 2020 without a precipitating injury  She reports pain over the lateral aspect and posterior aspect of her shoulder and along her scapula  She describes the pain as dull/ sharp, intermittent, mild to moderate intensity  No pain at rest   Pain aggravated with left upper extremity activity and lifting  She reports progressive loss of left shoulder range of motion  Denies shoulder clicking/popping    She reports difficulty putting on her bra and putting on her seatbelt  Denies numbness/ tingling, left arm swelling, skin color changes, and other ROS as per below  She has difficulty sleeping on her left shoulder at night as it aggravates the pain as well  She has intermittently used acetaminophen/ NSAIDs with mild relief  She reports she is able to accomplish her activities of daily living  But is concerned about her ability to return back to bicycle riding as her shoulder pain is aggravated while holding on to the handlebars  Denies history of prior left shoulder injury/ surgeries in the past     Review of Systems   Constitutional: Negative for chills, fatigue and fever  Respiratory: Negative for cough and shortness of breath  Gastrointestinal: Negative for abdominal pain, nausea and vomiting  Musculoskeletal:        As per HPI   Skin: Negative for rash and wound     Neurological:        As per HPI         Medical, Surgical, Family, and Social History    Past Medical History:   Diagnosis Date    Plantar fasciitis     Last assessed 2015      Past Surgical History:   Procedure Laterality Date    DILATION AND CURETTAGE OF UTERUS      INDUCED       Surgical treatment of spontaneous      Social History   Social History     Substance and Sexual Activity   Alcohol Use Never    Frequency: Never     Social History     Substance and Sexual Activity   Drug Use Never     Social History     Tobacco Use   Smoking Status Never Smoker   Smokeless Tobacco Never Used     Family History   Problem Relation Age of Onset    No Known Problems Mother     No Known Problems Father      No Known Allergies       Physical Exam  /77   Pulse 80   Ht 5' 7" (1 702 m)   Wt 74 4 kg (164 lb)   BMI 25 69 kg/m²     Constitutional:  see vital signs  Gen: well-developed, normocephalic/atraumatic, well-groomed  Eyes: No inflammation or discharge of conjunctiva or lids; sclera clear   Pharynx: no inflammation, lesion, or mass of lips  Neck: supple, no masses, non-distended  MSK: no inflammation, lesion, mass, or clubbing of nails and digits except for other than mentioned below  SKIN: no visible rashes or skin lesions  Pulmonary/Chest: Effort normal  No respiratory distress  NEURO: cranial nerves grossly intact  PSYCH:  Alert and oriented to person, place, and time; recent and remote memory intact; mood normal, no depression, anxiety, or agitation, judgment and insight good and intact     Ortho Exam  Shoulder exam:       LEFT    Inspection Erythema None     Swelling None     Increased Warmth None    Rotator cuff ER 5/5     IR 5/5     Abduction 5/5    ROM      Abduction 110 actively, 130 actively     ER0 40 (60 on right)     ER90 70 (90 on right)     IR90 40 (40 on right)     IRb Left buttock (T10 on right)    TTP:      Special Tests: O'Briens  (FF 90, add 10, resist thumbs up-, resist thumbs down+) Negative     Crank  (Abd 90, axial load, rotate) Negative     Cross body Adduction Negative     Speeds  Negative     Yergason's Negative     Leonidas's Positive     Drop arm test Negative       Neer Positive     Boyle Positive    Instability: Apprehension & relocation negative        UE NV Exam: +2 Radial pulses bilaterally  Sensation intact to light touch C5-T1 bilaterally, Radial/median/ulnar nerve motor intact      Bilateral elbow, wrist, and and forearm ROM full, painless with passive ROM, no ttp or crepitance throughout extremities below shoulder joint    Cervical ROM is full without pain, numbness or tingling                Procedures

## 2021-04-02 VITALS
SYSTOLIC BLOOD PRESSURE: 110 MMHG | WEIGHT: 164 LBS | HEART RATE: 80 BPM | BODY MASS INDEX: 25.74 KG/M2 | HEIGHT: 67 IN | DIASTOLIC BLOOD PRESSURE: 77 MMHG

## 2022-01-03 ENCOUNTER — OFFICE VISIT (OUTPATIENT)
Dept: OBGYN CLINIC | Facility: CLINIC | Age: 57
End: 2022-01-03
Payer: COMMERCIAL

## 2022-01-03 ENCOUNTER — APPOINTMENT (OUTPATIENT)
Dept: RADIOLOGY | Facility: CLINIC | Age: 57
End: 2022-01-03
Payer: COMMERCIAL

## 2022-01-03 VITALS
DIASTOLIC BLOOD PRESSURE: 72 MMHG | BODY MASS INDEX: 25.18 KG/M2 | HEIGHT: 67 IN | SYSTOLIC BLOOD PRESSURE: 124 MMHG | WEIGHT: 160.4 LBS

## 2022-01-03 DIAGNOSIS — M22.2X1 PATELLOFEMORAL DISORDER OF RIGHT KNEE: ICD-10-CM

## 2022-01-03 DIAGNOSIS — M22.2X2 PATELLOFEMORAL DISORDER OF LEFT KNEE: ICD-10-CM

## 2022-01-03 DIAGNOSIS — M25.561 ACUTE PAIN OF RIGHT KNEE: ICD-10-CM

## 2022-01-03 DIAGNOSIS — M25.562 ACUTE PAIN OF LEFT KNEE: Primary | ICD-10-CM

## 2022-01-03 DIAGNOSIS — S83.005A: ICD-10-CM

## 2022-01-03 DIAGNOSIS — M25.562 ACUTE PAIN OF LEFT KNEE: ICD-10-CM

## 2022-01-03 PROCEDURE — 99213 OFFICE O/P EST LOW 20 MIN: CPT | Performed by: PHYSICIAN ASSISTANT

## 2022-01-03 PROCEDURE — 73564 X-RAY EXAM KNEE 4 OR MORE: CPT
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
## 2022-01-03 NOTE — PATIENT INSTRUCTIONS
Patellofemoral Pain Syndrome   WHAT YOU NEED TO KNOW:   Patellofemoral pain syndrome (PFPS) is pain in or around your patella (kneecap)  PFPS is also called runner's knee or jumper's knee and is common in athletes  Rest, ice, and elevate your knee  You may need tape or brace to support your kneecap  Wear shoe inserts as directed and go to physical therapy  DISCHARGE INSTRUCTIONS:   Call your doctor if:   · You have trouble walking  · You have a fever  · Your knee brace or sleeve is too tight  · Your symptoms are not getting better, or they get worse  · Your pain and swelling increase even after you take pain medicine  · You have questions or concerns about your condition or care  Manage your symptoms:       · Change your activity  You may need to rest your knee  You may need to change your exercise routine to low-impact activities  · Apply ice  on your knee for 15 to 20 minutes every hour or as directed  Use an ice pack, or put crushed ice in a plastic bag  Cover it with a towel before you apply it  Ice helps prevent tissue damage and decreases swelling and pain  · Elevate  your knee above the level of your heart as often as you can  This will help decrease swelling and pain  Prop your leg on pillows or blankets to keep it elevated comfortably  Do not  put a pillow directly under your knee  · Support  your knee by wrapping it with tape or an elastic bandage  You may need a brace for more support  This will help decrease swelling and keep your kneecap in the correct spot  · Wear shoe inserts as directed  Orthotics or arch supports help keep your foot and ankle stable and in line to decrease stress on your knee  · Go to physical therapy as directed  A physical therapist will teach you exercises to help improve movement and strength, and to decrease pain  · Maintain a healthy weight  Ask your healthcare provider what a healthy weight is for you   Ask him or her to help you create a weight loss plan if you are overweight  Weight loss can help decrease pressure on your knee  Medicines: You may need the following:  · Acetaminophen  decreases pain and fever  It is available without a doctor's order  Ask how much to take and how often to take it  Follow directions  Read the labels of all other medicines you are using to see if they also contain acetaminophen, or ask your doctor or pharmacist  Acetaminophen can cause liver damage if not taken correctly  Do not use more than 4 grams (4,000 milligrams) total of acetaminophen in one day  · NSAIDs , such as ibuprofen, help decrease swelling, pain, and fever  This medicine is available with or without a doctor's order  NSAIDs can cause stomach bleeding or kidney problems in certain people  If you take blood thinner medicine, always ask your healthcare provider if NSAIDs are safe for you  Always read the medicine label and follow directions  · Take your medicine as directed  Contact your healthcare provider if you think your medicine is not helping or if you have side effects  Tell him or her if you are allergic to any medicine  Keep a list of the medicines, vitamins, and herbs you take  Include the amounts, and when and why you take them  Bring the list or the pill bottles to follow-up visits  Carry your medicine list with you in case of an emergency  Prevent another episode:   · Wear the right shoes for your activities  Increase activity gradually  · Warm up before you exercise  Stretch your leg muscles before and after activity  Follow up with your doctor as directed: You may be referred to an orthopedic surgeon  Write down your questions so you remember to ask them during your visits  © Copyright [employer] 2021 Information is for End User's use only and may not be sold, redistributed or otherwise used for commercial purposes   All illustrations and images included in CareNotes® are the copyrighted property of [employer]  or [address] [place] [address] [place]   The above information is an  only  It is not intended as medical advice for individual conditions or treatments  Talk to your doctor, nurse or pharmacist before following any medical regimen to see if it is safe and effective for you

## 2022-01-17 ENCOUNTER — EVALUATION (OUTPATIENT)
Dept: PHYSICAL THERAPY | Facility: CLINIC | Age: 57
End: 2022-01-17
Payer: COMMERCIAL

## 2022-01-17 DIAGNOSIS — M25.562 ACUTE PAIN OF LEFT KNEE: ICD-10-CM

## 2022-01-17 DIAGNOSIS — M22.2X1 PATELLOFEMORAL DISORDER OF RIGHT KNEE: ICD-10-CM

## 2022-01-17 DIAGNOSIS — M25.561 ACUTE PAIN OF RIGHT KNEE: ICD-10-CM

## 2022-01-17 DIAGNOSIS — M22.2X2 PATELLOFEMORAL DISORDER OF LEFT KNEE: Primary | ICD-10-CM

## 2022-01-17 PROCEDURE — 97110 THERAPEUTIC EXERCISES: CPT | Performed by: PHYSICAL THERAPIST

## 2022-01-17 PROCEDURE — 97161 PT EVAL LOW COMPLEX 20 MIN: CPT | Performed by: PHYSICAL THERAPIST
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
## 2022-01-20 ENCOUNTER — OFFICE VISIT (OUTPATIENT)
Dept: PHYSICAL THERAPY | Facility: CLINIC | Age: 57
End: 2022-01-20
Payer: COMMERCIAL

## 2022-01-20 DIAGNOSIS — M22.2X2 PATELLOFEMORAL DISORDER OF LEFT KNEE: Primary | ICD-10-CM

## 2022-01-20 DIAGNOSIS — M25.561 ACUTE PAIN OF RIGHT KNEE: ICD-10-CM

## 2022-01-20 DIAGNOSIS — M22.2X1 PATELLOFEMORAL DISORDER OF RIGHT KNEE: ICD-10-CM

## 2022-01-20 DIAGNOSIS — M25.562 ACUTE PAIN OF LEFT KNEE: ICD-10-CM

## 2022-01-20 PROCEDURE — 97140 MANUAL THERAPY 1/> REGIONS: CPT | Performed by: PHYSICAL THERAPIST

## 2022-01-20 PROCEDURE — 97110 THERAPEUTIC EXERCISES: CPT | Performed by: PHYSICAL THERAPIST

## 2022-01-20 NOTE — PROGRESS NOTES
Daily Note     Today's date: 2022  Patient name: Heidi Lugo  : 1965  MRN: [id number]  Referring provider: MISA Hansen*  Dx:   Encounter Diagnosis     ICD-10-CM    1  Patellofemoral disorder of left knee  M22 2X2    2  Patellofemoral disorder of right knee  M22 2X1    3  Acute pain of left knee  M25 562    4  Acute pain of right knee  M25 561                   Subjective:  Pt reports having severe L knee pain during the past 2 days  Her HEP felt fine, but woke up the next day with more pain  Objective:  See treatment diary below      Assessment:  Pt presented to outpatient physical therapy at [facility] with complaints of L>R knee pain  Most of her pain is medial L knee, but above the joint line  She presents with decreased L knee flex range of motion, decreased L>R LE strength, limited B H/S and L ITB flexibility, poor B LE balance, decreased tolerance to activity and 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  She will continue to benefit from skilled PT services in order to address these deficits and reach maximum level of function  She has been sleeping with a large bolster under her L knee all night and ambulating with poor L knee flexion  She was asked to modify both to lessen L knee stiffness  Addition of stretching lessened her pain significantly  She still has mod lateral patellar tracking of L, but less pain with manual correction with quad strengthening by PT  Plan:  Continue to add L quad strengthening as well as balance tasks  Normalize gait pattern  Try mini squats with progression to rocker board and bosu  SLS on foam and TB kicks for better proprioception as able         POC EXPIRES On:  3/14/22  PRECAUTIONS:  None  CO-MORBIDITES:  None  PERSONAL FACTORS:  Wants to return to mtn biking      Manuals HEP           TFM distal L ITB  5' 5'          PROM L knee flex   5'                                    Neuro Re-Ed SLS L/R             Tandem walk on foam 12' FWD   3 laps          Side stepping on foam 12'   3 laps                                                              Ther Ex    Strap supine L/R ITB stretch 1/17 20" 3 ea 20" 3 L          Bridging with ball adduction 1/17 5" 10 5" 15          Supine SLR with slight hip ER L/R 1/17 10  2x15          Strap L H/S stretch   20" 5          Wedge L calf stretch   20" 5          L LAQ   20          L SAQ   2x15          TB L TKE   Blue 30                                                 Ther Activity                              Gait Training                              Modalities

## 2022-01-24 ENCOUNTER — OFFICE VISIT (OUTPATIENT)
Dept: PHYSICAL THERAPY | Facility: CLINIC | Age: 57
End: 2022-01-24
Payer: COMMERCIAL

## 2022-01-24 DIAGNOSIS — M22.2X1 PATELLOFEMORAL DISORDER OF RIGHT KNEE: ICD-10-CM

## 2022-01-24 DIAGNOSIS — M22.2X2 PATELLOFEMORAL DISORDER OF LEFT KNEE: Primary | ICD-10-CM

## 2022-01-24 PROCEDURE — 97110 THERAPEUTIC EXERCISES: CPT | Performed by: PHYSICAL THERAPIST

## 2022-01-24 PROCEDURE — 97112 NEUROMUSCULAR REEDUCATION: CPT | Performed by: PHYSICAL THERAPIST

## 2022-01-24 NOTE — PROGRESS NOTES
Daily Note     Today's date: 2022  Patient name: Chris Noyola  : 1965  MRN: [id number]  Referring provider: MISA Sims*  Dx:   Encounter Diagnosis     ICD-10-CM    1  Patellofemoral disorder of left knee  M22 2X2    2  Patellofemoral disorder of right knee  M22 2X1                   Subjective:  Pt reports having continued L knee pain most of the time  Thinks she may have lyme dz or a mechanical issue with her knee  Can't sleep well due to knee pain  Objective:  See treatment diary below      Assessment:  Pt presented to outpatient physical therapy at [place] [address] with complaints of L>R knee pain  Most of her pain is medial L knee, but above the joint line  She presents with decreased L knee flex range of motion, decreased L>R LE strength, limited B H/S and L ITB flexibility, poor B LE balance, decreased tolerance to activity and 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  She will continue to benefit from skilled PT services in order to address these deficits and reach maximum level of function  Hr L knee ROM was much better today due to getting rid of sleeping with a large bolster under her L knee all night and trying to ambulate with better L knee flexion  Adding more balance exercise was beneficial today  L patellar tracking was better today  Plan:  Continue to add L quad strengthening as well as balance tasks  Normalize gait pattern  Try mini squats with progression to rocker board and bosu  TB kicks for better proprioception as able         POC EXPIRES On:  3/14/22  PRECAUTIONS:  None  CO-MORBIDITES:  None  PERSONAL FACTORS:  Wants to return to mtn biking      Manuals HEP          TFM distal L ITB  5' 5' NP         PROM L knee flex   5' 5'         L patellar mobs to increase medial glide    3'                      Neuro Re-Ed     SLS L on disc    15" 10         Tandem walk on foam 12' FWD   3 laps 3 laps         Side stepping on foam 12'   3 laps 3 laps         Lunges onto bosu with L/R dome up FWD + SIDE    10 ea                                                Ther Ex    Strap supine L ITB stretch 1/17 20" 3 ea 20" 3 L held         Bridging with ball adduction 1/17 5" 10 5" 15 5" 2x10         Supine SLR with slight hip ER L 1/17 10  2x15 2x15         Strap L H/S stretch   20" 5 20" 5         Wedge L calf stretch   20" 5 20" 5         L LAQ   20 20         L SAQ   2x15 2x15         TB L TKE   Blue 30 Blue 30         Bike    L1 5'         B calf raises    20                      Ther Activity                              Gait Training                              Modalities

## 2022-01-27 ENCOUNTER — OFFICE VISIT (OUTPATIENT)
Dept: PHYSICAL THERAPY | Facility: CLINIC | Age: 57
End: 2022-01-27
Payer: COMMERCIAL

## 2022-01-27 DIAGNOSIS — M22.2X2 PATELLOFEMORAL DISORDER OF LEFT KNEE: Primary | ICD-10-CM

## 2022-01-27 DIAGNOSIS — M22.2X1 PATELLOFEMORAL DISORDER OF RIGHT KNEE: ICD-10-CM

## 2022-01-27 DIAGNOSIS — M25.561 ACUTE PAIN OF RIGHT KNEE: ICD-10-CM

## 2022-01-27 DIAGNOSIS — M25.562 ACUTE PAIN OF LEFT KNEE: ICD-10-CM

## 2022-01-27 PROCEDURE — 97112 NEUROMUSCULAR REEDUCATION: CPT

## 2022-01-27 PROCEDURE — 97110 THERAPEUTIC EXERCISES: CPT

## 2022-01-27 NOTE — PROGRESS NOTES
Daily Note     Today's date: 2022  Patient name: Khushboo Young  : 1965  MRN: [id number]  Referring provider: MISA Malin*  Dx:   Encounter Diagnosis     ICD-10-CM    1  Patellofemoral disorder of left knee  M22 2X2    2  Patellofemoral disorder of right knee  M22 2X1    3  Acute pain of left knee  M25 562    4  Acute pain of right knee  M25 561                   Subjective:  Pt reports having a lot of pain in her knee, despite coming to PT  Will be going back to MD to get it looked at again  Objective:  See treatment diary below      Assessment:  Pt presented to outpatient physical therapy at [place] [address] with complaints of L>R knee pain  Most of her pain is medial L knee, but above the joint line  She presents with decreased L knee flex range of motion, decreased L>R LE strength, limited B H/S and L ITB flexibility, poor B LE balance, decreased tolerance to activity and 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  She will continue to benefit from skilled PT services in order to address these deficits and reach maximum level of function  Highly advised pt to bend her knee when walking and avoid keeping her knee in extension throughout amb  Pt experienced increased discomfort during PROM, clicking was audible multiple times with pain  Plan:  Continue to add L quad strengthening as well as balance tasks  Normalize gait pattern  Try mini squats with progression to rocker board and bosu  TB kicks for better proprioception as able         POC EXPIRES On:  3/14/22  PRECAUTIONS:  None  CO-MORBIDITES:  None  PERSONAL FACTORS:  Wants to return to mtn biking      Manuals HEP         TFM distal L ITB  5' 5' NP         PROM L knee flex   5' 5' 8'        L patellar mobs to increase medial glide    3'                      Neuro Re-Ed     SLS L on disc    15" 10 Blue 3x20"        Tandem walk on foam 12' FWD   3 laps 3 laps 3 laps        Side stepping on foam 12'   3 laps 3 laps 3 laps        Lunges onto bosu with L/R dome up FWD + SIDE    10 ea p!                                                Ther Ex    Bike    L1 5' L1 5'        Strap supine L ITB stretch 1/17 20" 3 ea 20" 3 L held         Bridging with ball adduction 1/17 5" 10 5" 15 5" 2x10 5" 2x10        Supine SLR with slight hip ER L 1/17 10  2x15 2x15 2x15        Strap L H/S stretch   20" 5 20" 5 20" 5        Wedge L calf stretch   20" 5 20" 5 20" 5        L LAQ   20 20 20        L SAQ   2x15 2x15 2x15        TB L TKE   Blue 30 Blue 30 Blue 30        B calf raises    20 20                                  Ther Activity                              Gait Training                              Modalities

## 2022-01-28 ENCOUNTER — TELEPHONE (OUTPATIENT)
Dept: OBGYN CLINIC | Facility: HOSPITAL | Age: 57
End: 2022-01-28

## 2022-01-28 NOTE — TELEPHONE ENCOUNTER
Patient called in to reschedule her appointment Monday because she will not be in town  Call was disconnected as I was looking for appointments and talking to the patient  Another call came through

## 2022-01-31 ENCOUNTER — APPOINTMENT (OUTPATIENT)
Dept: PHYSICAL THERAPY | Facility: CLINIC | Age: 57
End: 2022-01-31
Payer: COMMERCIAL

## 2022-02-02 ENCOUNTER — OFFICE VISIT (OUTPATIENT)
Dept: FAMILY MEDICINE CLINIC | Facility: HOSPITAL | Age: 57
End: 2022-02-02
Payer: COMMERCIAL

## 2022-02-02 ENCOUNTER — TELEPHONE (OUTPATIENT)
Dept: OBGYN CLINIC | Facility: HOSPITAL | Age: 57
End: 2022-02-02

## 2022-02-02 VITALS
OXYGEN SATURATION: 98 % | HEART RATE: 82 BPM | WEIGHT: 190.2 LBS | SYSTOLIC BLOOD PRESSURE: 130 MMHG | TEMPERATURE: 97.7 F | DIASTOLIC BLOOD PRESSURE: 82 MMHG | HEIGHT: 67 IN | BODY MASS INDEX: 29.85 KG/M2

## 2022-02-02 DIAGNOSIS — M79.89 SWELLING OF LOWER EXTREMITY: ICD-10-CM

## 2022-02-02 DIAGNOSIS — M79.89 SWELLING OF BOTH HANDS: ICD-10-CM

## 2022-02-02 DIAGNOSIS — R29.898 WEAKNESS OF BOTH UPPER EXTREMITIES: ICD-10-CM

## 2022-02-02 DIAGNOSIS — R29.898 WEAKNESS OF BOTH LOWER EXTREMITIES: Primary | ICD-10-CM

## 2022-02-02 PROCEDURE — 99204 OFFICE O/P NEW MOD 45 MIN: CPT | Performed by: NURSE PRACTITIONER
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
## 2022-02-03 ENCOUNTER — APPOINTMENT (OUTPATIENT)
Dept: PHYSICAL THERAPY | Facility: CLINIC | Age: 57
End: 2022-02-03
Payer: COMMERCIAL

## 2022-02-03 NOTE — TELEPHONE ENCOUNTER
Tried to return patient's call  Her mailbox is full and cannot receive msgs  If she calls back, give her the above information  Will try later

## 2022-02-04 LAB
ALBUMIN SERPL-MCNC: 3.9 G/DL (ref 3.8–4.9)
ALBUMIN/GLOB SERPL: 1.4 {RATIO} (ref 1.2–2.2)
ALP SERPL-CCNC: 116 IU/L (ref 44–121)
ALT SERPL-CCNC: 28 IU/L (ref 0–32)
AST SERPL-CCNC: 24 IU/L (ref 0–40)
B BURGDOR IGG+IGM SER-ACNC: <0.91 ISR (ref 0–0.9)
B BURGDOR IGM SER IA-ACNC: <0.8 INDEX (ref 0–0.79)
BASOPHILS # BLD AUTO: 0 X10E3/UL (ref 0–0.2)
BASOPHILS NFR BLD AUTO: 1 %
BILIRUB SERPL-MCNC: 0.6 MG/DL (ref 0–1.2)
BUN SERPL-MCNC: 7 MG/DL (ref 6–24)
BUN/CREAT SERPL: 12 (ref 9–23)
CALCIUM SERPL-MCNC: 9.4 MG/DL (ref 8.7–10.2)
CENTROMERE B AB SER-ACNC: <0.2 AI (ref 0–0.9)
CHLORIDE SERPL-SCNC: 101 MMOL/L (ref 96–106)
CHROMATIN AB SERPL-ACNC: <0.2 AI (ref 0–0.9)
CO2 SERPL-SCNC: 23 MMOL/L (ref 20–29)
CREAT SERPL-MCNC: 0.57 MG/DL (ref 0.57–1)
CRP SERPL-MCNC: 15 MG/L (ref 0–10)
DSDNA AB SER-ACNC: <1 IU/ML (ref 0–9)
ENA JO1 AB SER-ACNC: <0.2 AI (ref 0–0.9)
ENA RNP AB SER-ACNC: 0.3 AI (ref 0–0.9)
ENA SCL70 AB SER-ACNC: <0.2 AI (ref 0–0.9)
ENA SM AB SER-ACNC: <0.2 AI (ref 0–0.9)
ENA SS-A AB SER-ACNC: <0.2 AI (ref 0–0.9)
ENA SS-B AB SER-ACNC: <0.2 AI (ref 0–0.9)
EOSINOPHIL # BLD AUTO: 0.3 X10E3/UL (ref 0–0.4)
EOSINOPHIL NFR BLD AUTO: 6 %
ERYTHROCYTE [DISTWIDTH] IN BLOOD BY AUTOMATED COUNT: 13.8 % (ref 11.7–15.4)
GLOBULIN SER-MCNC: 2.7 G/DL (ref 1.5–4.5)
GLUCOSE SERPL-MCNC: 90 MG/DL (ref 65–99)
HCT VFR BLD AUTO: 40.8 % (ref 34–46.6)
HGB BLD-MCNC: 13.4 G/DL (ref 11.1–15.9)
IMM GRANULOCYTES # BLD: 0 X10E3/UL (ref 0–0.1)
IMM GRANULOCYTES NFR BLD: 0 %
LYMPHOCYTES # BLD AUTO: 1 X10E3/UL (ref 0.7–3.1)
LYMPHOCYTES NFR BLD AUTO: 21 %
MCH RBC QN AUTO: 27.5 PG (ref 26.6–33)
MCHC RBC AUTO-ENTMCNC: 32.8 G/DL (ref 31.5–35.7)
MCV RBC AUTO: 84 FL (ref 79–97)
MONOCYTES # BLD AUTO: 0.5 X10E3/UL (ref 0.1–0.9)
MONOCYTES NFR BLD AUTO: 10 %
NEUTROPHILS # BLD AUTO: 2.9 X10E3/UL (ref 1.4–7)
NEUTROPHILS NFR BLD AUTO: 62 %
PLATELET # BLD AUTO: 228 X10E3/UL (ref 150–450)
POTASSIUM SERPL-SCNC: 4.4 MMOL/L (ref 3.5–5.2)
PROT SERPL-MCNC: 6.6 G/DL (ref 6–8.5)
RBC # BLD AUTO: 4.88 X10E6/UL (ref 3.77–5.28)
RHEUMATOID FACT SERPL-ACNC: >650 IU/ML
SL AMB EGFR AFRICAN AMERICAN: 121 ML/MIN/1.73
SL AMB EGFR NON AFRICAN AMERICAN: 105 ML/MIN/1.73
SL AMB SEE BELOW:: NORMAL
SODIUM SERPL-SCNC: 140 MMOL/L (ref 134–144)
TSH SERPL DL<=0.005 MIU/L-ACNC: 4.27 UIU/ML (ref 0.45–4.5)
VIT B12 SERPL-MCNC: 310 PG/ML (ref 232–1245)
WBC # BLD AUTO: 4.7 X10E3/UL (ref 3.4–10.8)

## 2022-02-07 ENCOUNTER — OFFICE VISIT (OUTPATIENT)
Dept: PHYSICAL THERAPY | Facility: CLINIC | Age: 57
End: 2022-02-07
Payer: COMMERCIAL

## 2022-02-07 ENCOUNTER — TELEPHONE (OUTPATIENT)
Dept: FAMILY MEDICINE CLINIC | Facility: HOSPITAL | Age: 57
End: 2022-02-07

## 2022-02-07 DIAGNOSIS — M25.561 ACUTE PAIN OF RIGHT KNEE: ICD-10-CM

## 2022-02-07 DIAGNOSIS — M25.562 ACUTE PAIN OF LEFT KNEE: ICD-10-CM

## 2022-02-07 DIAGNOSIS — M22.2X1 PATELLOFEMORAL DISORDER OF RIGHT KNEE: ICD-10-CM

## 2022-02-07 DIAGNOSIS — M22.2X2 PATELLOFEMORAL DISORDER OF LEFT KNEE: Primary | ICD-10-CM

## 2022-02-07 PROCEDURE — 97110 THERAPEUTIC EXERCISES: CPT

## 2022-02-07 PROCEDURE — 97140 MANUAL THERAPY 1/> REGIONS: CPT

## 2022-02-07 NOTE — PROGRESS NOTES
Daily Note     Today's date: 2022  Patient name: Kevin Leal  : 1965  MRN: [id number]  Referring provider: MISA Whitten*  Dx:   Encounter Diagnosis     ICD-10-CM    1  Patellofemoral disorder of left knee  M22 2X2    2  Patellofemoral disorder of right knee  M22 2X1    3  Acute pain of left knee  M25 562    4  Acute pain of right knee  M25 561                   Subjective:  Pt reports having a lot of pain and swelling throughout her body  MD has ordered multiple test due to the irregularities  Objective:  See treatment diary below  Survey Monkey given (___) and FOTO ()    Assessment:  Pt presented to outpatient physical therapy at [place] [address] with complaints of L>R knee pain  Most of her pain is medial L knee, but above the joint line  She presents with decreased L knee flex range of motion, decreased L>R LE strength, limited B H/S and L ITB flexibility, poor B LE balance, decreased tolerance to activity and 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  She will continue to benefit from skilled PT services in order to address these deficits and reach maximum level of function  Highly advised pt to bend her knee when walking and avoid keeping her knee in extension throughout amb  Emphasized to pt the importance of moving throughout the day to avoid getting stiff  Plan:  Continue to add L quad strengthening as well as balance tasks  Normalize gait pattern  Try mini squats with progression to rocker board and bosu  TB kicks for better proprioception as able         POC EXPIRES On:  3/14/22  PRECAUTIONS:  None  CO-MORBIDITES:  None  PERSONAL FACTORS:  Wants to return to mtn biking      Manuals HEP  2/7       TFM distal L ITB  5' 5' NP         PROM L knee flex   5' 5' 8' 10'       L patellar mobs to increase medial glide    3'                      Neuro Re-Ed     SLS L on disc    15" 10 Blue 3x20" Blue 3x20"       Tandem walk on foam 12' FWD   3 laps 3 laps 3 laps 3 laps       Side stepping on foam 12'   3 laps 3 laps 3 laps 3 laps       Lunges onto bosu with L/R dome up FWD + SIDE    10 ea p!                                                Ther Ex    Bike    L1 5' L1 5' L1 5'       Strap supine L ITB stretch 1/17 20" 3 ea 20" 3 L held         Bridging with ball adduction 1/17 5" 10 5" 15 5" 2x10 5" 2x10        Supine SLR with slight hip ER L 1/17 10  2x15 2x15 2x15        Strap L H/S stretch   20" 5 20" 5 20" 5        Wedge L calf stretch   20" 5 20" 5 20" 5 20" 5       L LAQ   20 20 20        L SAQ   2x15 2x15 2x15        TB L TKE   Blue 30 Blue 30 Blue 30        B calf raises    20 20 20                                 Ther Activity    Sit->stand      10x                    Gait Training                              Modalities

## 2022-02-10 ENCOUNTER — OFFICE VISIT (OUTPATIENT)
Dept: PHYSICAL THERAPY | Facility: CLINIC | Age: 57
End: 2022-02-10
Payer: COMMERCIAL

## 2022-02-10 DIAGNOSIS — M25.562 ACUTE PAIN OF LEFT KNEE: ICD-10-CM

## 2022-02-10 DIAGNOSIS — M25.561 ACUTE PAIN OF RIGHT KNEE: ICD-10-CM

## 2022-02-10 DIAGNOSIS — M22.2X2 PATELLOFEMORAL DISORDER OF LEFT KNEE: Primary | ICD-10-CM

## 2022-02-10 DIAGNOSIS — M22.2X1 PATELLOFEMORAL DISORDER OF RIGHT KNEE: ICD-10-CM

## 2022-02-10 PROCEDURE — 97110 THERAPEUTIC EXERCISES: CPT | Performed by: PHYSICAL THERAPIST

## 2022-02-10 PROCEDURE — 97112 NEUROMUSCULAR REEDUCATION: CPT | Performed by: PHYSICAL THERAPIST

## 2022-02-10 NOTE — PROGRESS NOTES
Daily Note     Today's date: 2/10/2022  Patient name: Warden Nance  : 1965  MRN: [id number]  Referring provider: MISA Stevens*  Dx:   Encounter Diagnosis     ICD-10-CM    1  Patellofemoral disorder of left knee  M22 2X2    2  Patellofemoral disorder of right knee  M22 2X1    3  Acute pain of left knee  M25 562    4  Acute pain of right knee  M25 561                   Subjective:  Pt reports having less pain today  Concerned she may have MS  Objective:  See treatment diary below Survey Monkey given (___) and FOTO ()      Assessment:  Pt presented to outpatient physical therapy at [place] [address] with complaints of L>R knee pain  Most of her pain is medial L knee, but above the joint line  She presents with decreased L knee flex range of motion, decreased L>R LE strength, limited B H/S and L ITB flexibility, poor B LE balance, decreased tolerance to activity and 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  She will continue to benefit from skilled PT services in order to address these deficits and reach maximum level of function  Pt still has poor L knee flex with swing phase of gait  However, tolerance of exercise was much better today  Pt has been focusing on a diet with less inflammation  Plan:  Continue to add L quad strengthening as well as balance tasks  Normalize gait pattern  Try mini squats with progression to rocker board and bosu  TB kicks for better proprioception as able  To MD on 2/15/22 and to rheumatologist after PT today  MRI for c-spine and brain on 22         POC EXPIRES On:  3/14/22  PRECAUTIONS:  None  CO-MORBIDITES:  None  PERSONAL FACTORS:  Wants to return to mtn biking      Manuals HEP 1/17 1/20 1/24 1/27 2/7 2/10      TFM distal L ITB  5' 5' NP         PROM L knee flex   5' 5' 8' 10' 8'      L patellar mobs to increase medial glide    3'                      Neuro Re-Ed     SLS L on disc    15" 10 Blue 3x20" Blue 3x20" Black 20" 5      Tandem walk on foam 12' FWD   3 laps 3 laps 3 laps 3 laps 3 laps      Side stepping on foam 12'   3 laps 3 laps 3 laps 3 laps 3 laps      Lunges onto bosu with L/R dome up FWD + SIDE    10 ea p!                                                Ther Ex    Bike    L1 5' L1 5' L1 5' L1 5'      Strap supine L ITB stretch 1/17 20" 3 ea 20" 3 L held         Bridging with ball adduction 1/17 5" 10 5" 15 5" 2x10 5" 2x10  5" 2x15      Supine SLR with slight hip ER L 1/17 10  2x15 2x15 2x15  2x15      Strap L H/S stretch   20" 5 20" 5 20" 5  20" 5      Wedge L calf stretch   20" 5 20" 5 20" 5 20" 5 20" 5      L LAQ   20 20 20  2x15      L SAQ   2x15 2x15 2x15  2x15      TB L TKE   Blue 30 Blue 30 Blue 30  Blue 30      B calf raises    20 20 20 20                                Ther Activity    Sit->stand with ball hip adduction      10x - no ball 10                   Gait Training                              Modalities

## 2022-02-14 ENCOUNTER — OFFICE VISIT (OUTPATIENT)
Dept: PHYSICAL THERAPY | Facility: CLINIC | Age: 57
End: 2022-02-14
Payer: COMMERCIAL

## 2022-02-14 DIAGNOSIS — M25.561 ACUTE PAIN OF RIGHT KNEE: ICD-10-CM

## 2022-02-14 DIAGNOSIS — M25.562 ACUTE PAIN OF LEFT KNEE: ICD-10-CM

## 2022-02-14 DIAGNOSIS — M22.2X2 PATELLOFEMORAL DISORDER OF LEFT KNEE: Primary | ICD-10-CM

## 2022-02-14 DIAGNOSIS — M22.2X1 PATELLOFEMORAL DISORDER OF RIGHT KNEE: ICD-10-CM

## 2022-02-14 PROCEDURE — 97110 THERAPEUTIC EXERCISES: CPT

## 2022-02-14 PROCEDURE — 97112 NEUROMUSCULAR REEDUCATION: CPT

## 2022-02-14 NOTE — PROGRESS NOTES
Daily Note     Today's date: 2022  Patient name: Kenton Patel  : 1965  MRN: [id number]  Referring provider: MISA Shrestha*  Dx:   Encounter Diagnosis     ICD-10-CM    1  Patellofemoral disorder of left knee  M22 2X2    2  Patellofemoral disorder of right knee  M22 2X1    3  Acute pain of left knee  M25 562    4  Acute pain of right knee  M25 561                   Subjective: Patient noted L knee pain today and stiffness  Objective: See treatment diary below      Assessment: Tolerated treatment fair  Patient exhibited good technique with therapeutic exercises and would benefit from continued PT  Plan: Continue per plan of care  POC EXPIRES On:  3/14/22  PRECAUTIONS:  None  CO-MORBIDITES:  None  PERSONAL FACTORS:  Wants to return to mtn biking      Manuals HEP 1/17 1/20 1/24 1/27 2/7 2/10 2/14     TFM distal L ITB  5' 5' NP         PROM L knee flex   5' 5' 8' 10' 8' 8'     L patellar mobs to increase medial glide    3'                      Neuro Re-Ed     SLS L on disc    15" 10 Blue 3x20" Blue 3x20" Black 20" 5 Black 20" x5      Tandem walk on foam 12' FWD   3 laps 3 laps 3 laps 3 laps 3 laps 3 laps      Side stepping on foam 12'   3 laps 3 laps 3 laps 3 laps 3 laps 3 laps     Lunges onto bosu with L/R dome up FWD + SIDE    10 ea p!                                                Ther Ex    Bike    L1 5' L1 5' L1 5' L1 5' L1 5'     Strap supine L ITB stretch  20" 3 ea 20" 3 L held         Bridging with ball adduction  5" 10 5" 15 5" 2x10 5" 2x10  5" 2x15 5" 2x15     Supine SLR with slight hip ER L  10  2x15 2x15 2x15  2x15 2x15     Strap L H/S stretch   20" 5 20" 5 20" 5  20" 5 20" 5     Wedge L calf stretch   20" 5 20" 5 20" 5 20" 5 20" 5 20"x 5     L LAQ   20 20 20  2x15 2x15     L SAQ   2x15 2x15 2x15  2x15 2x15     TB L TKE   Blue 30 Blue 30 Blue 30  Blue 30      B calf raises    20 20 20 20 20                               Ther Activity    Sit->stand with ball hip adduction      10x - no ball 10 10 x                   Gait Training                              Modalities

## 2022-02-15 ENCOUNTER — OFFICE VISIT (OUTPATIENT)
Dept: OBGYN CLINIC | Facility: CLINIC | Age: 57
End: 2022-02-15
Payer: COMMERCIAL

## 2022-02-15 VITALS
WEIGHT: 187.4 LBS | HEIGHT: 67 IN | SYSTOLIC BLOOD PRESSURE: 118 MMHG | BODY MASS INDEX: 29.41 KG/M2 | DIASTOLIC BLOOD PRESSURE: 78 MMHG

## 2022-02-15 DIAGNOSIS — M23.92 INTERNAL DERANGEMENT OF LEFT KNEE: Primary | ICD-10-CM

## 2022-02-15 PROCEDURE — 99204 OFFICE O/P NEW MOD 45 MIN: CPT | Performed by: ORTHOPAEDIC SURGERY

## 2022-02-15 PROCEDURE — 3008F BODY MASS INDEX DOCD: CPT | Performed by: ORTHOPAEDIC SURGERY

## 2022-02-15 RX ORDER — PREDNISONE 1 MG/1
TABLET ORAL
COMMUNITY
Start: 2022-02-10 | End: 2022-07-06 | Stop reason: DRUGHIGH

## 2022-02-15 NOTE — PROGRESS NOTES
Assessment:     1  Internal derangement of left knee        Plan:     Problem List Items Addressed This Visit     None      Visit Diagnoses     Internal derangement of left knee    -  Primary    Relevant Orders    MRI knee left  wo contrast        Findings consistent with acute left knee pain, suspect medial meniscus injury from twisting injury in November  Imaging and prognosis reviewed  Tenderness medial joint line, positive Apley and medial Asmita with mechanical symptoms  Will get MRI of left knee to confirm diagnosis and determine future course of treatment  If she does have medial meniscal tear then arthroscopic partial medial meniscectomy would be recommended  Avoid deep squats, pivoting or twisting motions  She can continue with knee brace for support  She currently on prednisone for rheumatoid  See patient back for MRI review of left knee  All patient's questions were answered to her satisfaction  This note is created using dictation transcription  It may contain typographical errors, grammatical errors, improperly dictated words, background noise and other errors  Subjective:     Patient ID: Nir Rodriguez is a 55 y o  female  Chief Complaint:  55 yr old female in for evaluation of bilateral knee pain  Seen by MISA Dumas 1/3/22  November while playing tennis twisting injury to left knee with immediate pain and swelling, felt a pop  She iced that night and followed with bracing over next few days  December she was helping sister move and did a lot of lifting, carrying, squatting and aggravated knee again, swelled up  Pain is sharp, stabbing medial aspect of knee, pain with pivoting or twisting motions, sense of knee giving out, feels like it locks at times  Goes down stairs one foot at a time  She has attended 6 sessions of physical therapy but continues with pain in both knee's  Right knee no injury more from favoring left knee   She was just diagnosed with rheumatoid arthritis, general body ache and just started prednisone  Information on patient's intake form was reviewed  Allergy:  No Known Allergies  Medications:  all current active meds have been reviewed  Past Medical History:  Past Medical History:   Diagnosis Date    Plantar fasciitis     Last assessed 2015     Rheumatoid arteritis ([facility] [address] ) 2022     Past Surgical History:  Past Surgical History:   Procedure Laterality Date    DILATION AND CURETTAGE OF UTERUS      INDUCED       Surgical treatment of spontaneous      Family History:  Family History   Problem Relation Age of Onset    No Known Problems Mother     No Known Problems Father      Social History:  Social History     Substance and Sexual Activity   Alcohol Use Never     Social History     Substance and Sexual Activity   Drug Use Never     Social History     Tobacco Use   Smoking Status Never Smoker   Smokeless Tobacco Never Used     Review of Systems   Constitutional: Positive for activity change and fatigue  Negative for chills and fever  HENT: Negative for ear pain and sore throat  Eyes: Negative for pain and visual disturbance  Respiratory: Negative for cough and shortness of breath  Cardiovascular: Negative for chest pain and palpitations  Gastrointestinal: Negative for abdominal pain and vomiting  Genitourinary: Negative for dysuria and hematuria  Musculoskeletal: Positive for arthralgias (Multiple joints), gait problem (Antalgic), joint swelling (Bilateral knee) and myalgias  Negative for back pain  Skin: Negative for color change and rash  Neurological: Negative for seizures and syncope  Psychiatric/Behavioral: Negative  All other systems reviewed and are negative          Objective:  BP Readings from Last 1 Encounters:   02/15/22 118/78      Wt Readings from Last 1 Encounters:   02/15/22 85 kg (187 lb 6 4 oz)      BMI:   Estimated body mass index is 29 35 kg/m² as calculated from the following:    Height as of this encounter: 5' 7" (1 702 m)  Weight as of this encounter: 85 kg (187 lb 6 4 oz)  BSA:   Estimated body surface area is 1 97 meters squared as calculated from the following:    Height as of this encounter: 5' 7" (1 702 m)  Weight as of this encounter: 85 kg (187 lb 6 4 oz)  Physical Exam  Vitals and nursing note reviewed  Constitutional:       Appearance: Normal appearance  She is well-developed  HENT:      Head: Normocephalic and atraumatic  Right Ear: External ear normal       Left Ear: External ear normal    Eyes:      Extraocular Movements: Extraocular movements intact  Conjunctiva/sclera: Conjunctivae normal    Pulmonary:      Effort: Pulmonary effort is normal    Musculoskeletal:      Cervical back: Neck supple  Right knee: Effusion (Trace) present  Instability Tests: Medial Asmita test negative and lateral Asmita test negative  Left knee: Effusion (Trace) present  Instability Tests: Medial Asmita test positive  Lateral Asmita test negative  Skin:     General: Skin is warm and dry  Neurological:      Mental Status: She is alert and oriented to person, place, and time  Deep Tendon Reflexes: Reflexes are normal and symmetric  Psychiatric:         Mood and Affect: Mood normal          Behavior: Behavior normal        Right Knee Exam     Tenderness   The patient is experiencing no tenderness  Range of Motion   The patient has normal right knee ROM  Tests   Asmita:  Medial - negative Lateral - negative  Varus: negative Valgus: negative  Patellar apprehension: negative    Other   Erythema: absent  Sensation: normal  Pulse: present  Swelling: mild  Effusion: effusion (Trace) present      Left Knee Exam     Muscle Strength   The patient has normal left knee strength  Tenderness   The patient is experiencing tenderness in the medial joint line      Range of Motion   Extension: 0   Flexion: 130     Tests   Asmita:  Medial - positive Lateral - negative  Varus: negative Valgus: negative  Patellar apprehension: negative    Other   Erythema: absent  Scars: absent  Sensation: normal  Pulse: present  Swelling: mild  Effusion: effusion (Trace) present    Comments: Well tracking patella with crepitation             I have personally reviewed pertinent films in PACS and my interpretation is xr bilateral knee's demonstrates right knee mild degenerative changes medial, left knee mild tricompartmental degenerative changes wiht spurring and ostephyte formation tricompartmentally        Scribe Attestation    I,:  Teto Ramirez am acting as a scribe while in the presence of the attending physician :       I,:  Lucina Noonan MD personally performed the services described in this documentation    as scribed in my presence :

## 2022-02-17 ENCOUNTER — APPOINTMENT (OUTPATIENT)
Dept: PHYSICAL THERAPY | Facility: CLINIC | Age: 57
End: 2022-02-17
Payer: COMMERCIAL

## 2022-02-21 ENCOUNTER — HOSPITAL ENCOUNTER (OUTPATIENT)
Dept: MRI IMAGING | Facility: HOSPITAL | Age: 57
Discharge: HOME | End: 2022-02-21
Payer: COMMERCIAL

## 2022-02-21 DIAGNOSIS — M23.92 INTERNAL DERANGEMENT OF LEFT KNEE: ICD-10-CM

## 2022-02-21 PROCEDURE — 73721 MRI JNT OF LWR EXTRE W/O DYE: CPT

## 2022-02-21 PROCEDURE — G1004 CDSM NDSC: HCPCS

## 2022-02-22 ENCOUNTER — TELEPHONE (OUTPATIENT)
Dept: OBGYN CLINIC | Facility: HOSPITAL | Age: 57
End: 2022-02-22

## 2022-02-25 ENCOUNTER — OFFICE VISIT (OUTPATIENT)
Dept: OBGYN CLINIC | Facility: CLINIC | Age: 57
End: 2022-02-25
Payer: COMMERCIAL

## 2022-02-25 VITALS — DIASTOLIC BLOOD PRESSURE: 78 MMHG | WEIGHT: 188 LBS | SYSTOLIC BLOOD PRESSURE: 122 MMHG | BODY MASS INDEX: 29.44 KG/M2

## 2022-02-25 DIAGNOSIS — M17.5 OTHER SECONDARY OSTEOARTHRITIS OF LEFT KNEE: Primary | ICD-10-CM

## 2022-02-25 PROBLEM — M17.12 OSTEOARTHRITIS OF LEFT KNEE: Status: ACTIVE | Noted: 2022-02-25

## 2022-02-25 PROCEDURE — 99213 OFFICE O/P EST LOW 20 MIN: CPT | Performed by: ORTHOPAEDIC SURGERY

## 2022-02-25 PROCEDURE — 1036F TOBACCO NON-USER: CPT | Performed by: ORTHOPAEDIC SURGERY

## 2022-02-25 NOTE — PROGRESS NOTES
Assessment:     1  Other secondary osteoarthritis of left knee        Plan:     Problem List Items Addressed This Visit        Musculoskeletal and Integument    Osteoarthritis of left knee - Primary     Findings consistent with left knee effusion with synovitis and early osteoarthritis most likely rheumatoid related  Discussed findings and treatment options with the patient  I reviewed patient's left knee MRI with her  I discussed prognosis of her knee condition  I discussed aspiration and cortisone injection, but patient declined since her knee is feeling better  I advised patient to take over-the-counter NSAID and continue treatment with rheumatologist   If patient's symptoms worsens, I recommend her to return to have the knee aspirated and injected  I encouraged low-impact exercises with stationary bike or swimming  We will see patient back as needed  All patient's questions were answered to her satisfaction  This note is created using dictation transcription  It may contain typographical errors, grammatical errors, improperly dictated words, background noise and other errors  Subjective:     Patient ID: Earle Butler is a 55 y o  female  Chief Complaint:  41-year-old female follow-up left knee pain and swelling  Patient is here to review her left knee MRI  Patient continued to walk with a slight limp  Her pain has improved  She denies locking but has giving way sensations  Pain with walking, squatting, kneeling, and climbing      Allergy:  No Known Allergies  Medications:  all current active meds have been reviewed  Past Medical History:  Past Medical History:   Diagnosis Date    Plantar fasciitis     Last assessed 2015     [facility]     Past Surgical History:  Past Surgical History:   Procedure Laterality Date    DILATION AND CURETTAGE OF UTERUS      INDUCED       Surgical treatment of spontaneous      Family History:  Family History Problem Relation Age of Onset    No Known Problems Mother     No Known Problems Father      Social History:  Social History     Substance and Sexual Activity   Alcohol Use Never     Social History     Substance and Sexual Activity   Drug Use Never     Social History     Tobacco Use   Smoking Status Never Smoker   Smokeless Tobacco Never Used     Review of Systems   Constitutional: Negative  HENT: Negative  Eyes: Negative  Respiratory: Negative  Cardiovascular: Negative  Gastrointestinal: Negative  Endocrine: Negative  Genitourinary: Negative  Musculoskeletal: Positive for arthralgias (Left knee), gait problem (Antalgic) and joint swelling (Left knee)  Skin: Negative  Allergic/Immunologic: Negative  Hematological: Negative  Psychiatric/Behavioral: Negative  Objective:  BP Readings from Last 1 Encounters:   02/25/22 122/78      Wt Readings from Last 1 Encounters:   02/25/22 85 3 kg (188 lb)      BMI:   Estimated body mass index is 29 44 kg/m² as calculated from the following:    Height as of 2/15/22: 5' 7" (1 702 m)  Weight as of this encounter: 85 3 kg (188 lb)  BSA:   Estimated body surface area is 1 97 meters squared as calculated from the following:    Height as of 2/15/22: 5' 7" (1 702 m)  Weight as of this encounter: 85 3 kg (188 lb)  Physical Exam  Vitals and nursing note reviewed  Constitutional:       Appearance: Normal appearance  She is well-developed  HENT:      Head: Normocephalic and atraumatic  Right Ear: External ear normal       Left Ear: External ear normal    Eyes:      Extraocular Movements: Extraocular movements intact  Conjunctiva/sclera: Conjunctivae normal    Pulmonary:      Effort: Pulmonary effort is normal    Musculoskeletal:      Cervical back: Neck supple  Left knee: Effusion (Grade 1) present  Instability Tests: Medial Asmita test positive  Lateral Asmita test negative     Skin:     General: Skin is warm and dry  Neurological:      Mental Status: She is alert and oriented to person, place, and time  Deep Tendon Reflexes: Reflexes are normal and symmetric  Psychiatric:         Mood and Affect: Mood normal          Behavior: Behavior normal        Left Knee Exam     Muscle Strength   The patient has normal left knee strength  Tenderness   The patient is experiencing tenderness in the medial joint line  Range of Motion   Extension: normal   Flexion: 130 (Pain)     Tests   Asmita:  Medial - positive Lateral - negative  Varus: negative Valgus: negative  Patellar apprehension: negative    Other   Erythema: absent  Sensation: normal  Pulse: present  Swelling: mild  Effusion: effusion (Grade 1) present            I have personally reviewed pertinent films in PACS and my interpretation is Knee MRI show effusions and synovitis with Baker cyst posteriorly  Intact ACL, PCL, LCL, MCL, and meniscus  Grade 2 chondrosis diffusely

## 2022-02-25 NOTE — ASSESSMENT & PLAN NOTE
Findings consistent with left knee effusion with synovitis and early osteoarthritis most likely rheumatoid related  Discussed findings and treatment options with the patient  I reviewed patient's left knee MRI with her  I discussed prognosis of her knee condition  I discussed aspiration and cortisone injection, but patient declined since her knee is feeling better  I advised patient to take over-the-counter NSAID and continue treatment with rheumatologist   If patient's symptoms worsens, I recommend her to return to have the knee aspirated and injected  I encouraged low-impact exercises with stationary bike or swimming  We will see patient back as needed  All patient's questions were answered to her satisfaction  This note is created using dictation transcription  It may contain typographical errors, grammatical errors, improperly dictated words, background noise and other errors

## 2022-03-02 ENCOUNTER — OFFICE VISIT (OUTPATIENT)
Dept: FAMILY MEDICINE CLINIC | Facility: HOSPITAL | Age: 57
End: 2022-03-02
Payer: COMMERCIAL

## 2022-03-02 VITALS
WEIGHT: 187.4 LBS | HEIGHT: 67 IN | BODY MASS INDEX: 29.41 KG/M2 | HEART RATE: 78 BPM | SYSTOLIC BLOOD PRESSURE: 128 MMHG | OXYGEN SATURATION: 98 % | DIASTOLIC BLOOD PRESSURE: 76 MMHG | TEMPERATURE: 97.8 F

## 2022-03-02 DIAGNOSIS — Z00.00 ANNUAL PHYSICAL EXAM: Primary | ICD-10-CM

## 2022-03-02 DIAGNOSIS — M05.79 RHEUMATOID ARTHRITIS INVOLVING MULTIPLE SITES WITH POSITIVE RHEUMATOID FACTOR (HCC): ICD-10-CM

## 2022-03-02 DIAGNOSIS — Z12.11 SCREEN FOR COLON CANCER: ICD-10-CM

## 2022-03-02 PROCEDURE — 99396 PREV VISIT EST AGE 40-64: CPT | Performed by: NURSE PRACTITIONER

## 2022-03-02 PROCEDURE — 3725F SCREEN DEPRESSION PERFORMED: CPT | Performed by: NURSE PRACTITIONER

## 2022-03-02 PROCEDURE — 3008F BODY MASS INDEX DOCD: CPT | Performed by: NURSE PRACTITIONER

## 2022-03-02 PROCEDURE — 1036F TOBACCO NON-USER: CPT | Performed by: NURSE PRACTITIONER

## 2022-03-02 NOTE — PROGRESS NOTES
[address] [facility] [address]     NAME: Yuly Foreman  AGE: 55 y o  SEX: female  : 1965     DATE: 3/2/2022     Assessment and Plan:     Problem List Items Addressed This Visit        Musculoskeletal and Integument    Rheumatoid arthritis involving multiple sites with positive rheumatoid factor ([address] )     New diagnosis  Following with rheum  Currently on prednisone  Unsure if she will get treatment with immunosuppressants  Also following with functional medicine  Other Visit Diagnoses     Annual physical exam    -  Primary    Screen for colon cancer        Relevant Orders    Cologuard          Immunizations and preventive care screenings were discussed with patient today  Appropriate education was printed on patient's after visit summary  Counseling:  Alcohol/drug use: discussed moderation in alcohol intake, the recommendations for healthy alcohol use, and avoidance of illicit drug use  Dental Health: discussed importance of regular tooth brushing, flossing, and dental visits  Injury prevention: discussed safety/seat belts, safety helmets, smoke detectors, carbon dioxide detectors, and smoking near bedding or upholstery  Sexual health: discussed sexually transmitted diseases, partner selection, use of condoms, avoidance of unintended pregnancy, and contraceptive alternatives  · Exercise: the importance of regular exercise/physical activity was discussed  Recommend exercise 3-5 times per week for at least 30 minutes  BMI Counseling: Body mass index is 29 35 kg/m²  The BMI is above normal  Nutrition recommendations include decreasing portion sizes, encouraging healthy choices of fruits and vegetables, decreasing fast food intake, consuming healthier snacks, limiting drinks that contain sugar, moderation in carbohydrate intake and increasing intake of lean protein   Exercise recommendations include moderate physical activity 150 minutes/week  No pharmacotherapy was ordered  Rationale for BMI follow-up plan is due to patient being overweight or obese  Depression Screening and Follow-up Plan: Patient was screened for depression during today's encounter  They screened negative with a PHQ-2 score of 1  Return in 1 year (on 3/2/2023) for Annual physical      Chief Complaint:     Chief Complaint   Patient presents with    Follow-up      History of Present Illness:     Adult Annual Physical   Patient here for a comprehensive physical exam  The patient reports she is following with functional medicine,orthopedics, and rheumatology for her newly diagnosed RA  Is trying holistic approaches first  No other concerns at this time  She is adamant that she will not get a mammogram or a pap smear  She is open to having Cologuard  Hep C and HIV screening has been ordered by rheumatology  Diet and Physical Activity  · Diet/Nutrition: well balanced diet, limited junk food, consuming 3-5 servings of fruits/vegetables daily and adequate fiber intake  · Exercise: no formal exercise  Depression Screening  PHQ-2/9 Depression Screening    Little interest or pleasure in doing things: 1 - several days  Feeling down, depressed, or hopeless: 0 - not at all  Trouble falling or staying asleep, or sleeping too much: 3 - nearly every day  Feeling tired or having little energy: 1 - several days  Poor appetite or overeatin - several days  Feeling bad about yourself - or that you are a failure or have let yourself or your family down: 0 - not at all  Trouble concentrating on things, such as reading the newspaper or watching television: 3 - nearly every day  Moving or speaking so slowly that other people could have noticed   Or the opposite - being so fidgety or restless that you have been moving around a lot more than usual: 0 - not at all  Thoughts that you would be better off dead, or of hurting yourself in some way: 0 - not at all  PHQ-2 Score: 1  PHQ-2 Interpretation: Negative depression screen  PHQ-9 Score: 9   PHQ-9 Interpretation: Mild depression        General Health  · Sleep: sleeps poorly, gets 4-6 hours of sleep on average and experiences daytime hypersomnolence  · Hearing: normal - none   · Vision: no vision problems  · Dental: regular dental visits, no dental visits for >1 year, brushes teeth once daily and flosses teeth occasionally  /GYN Health  · Patient is: perimenopausal  · Last menstrual period: 10/21  · Contraceptive method: none     Review of Systems:     Review of Systems   Constitutional: Negative  HENT: Negative  Eyes: Negative  Respiratory: Negative  Cardiovascular: Positive for leg swelling  Negative for chest pain and palpitations  Gastrointestinal: Negative  Endocrine: Negative  Genitourinary: Negative  Musculoskeletal: Positive for arthralgias and joint swelling  Negative for back pain, gait problem, myalgias, neck pain and neck stiffness  Skin: Negative  Neurological: Positive for weakness  Negative for dizziness, tremors, seizures, syncope, facial asymmetry, speech difficulty, light-headedness and headaches  Hematological: Negative  Psychiatric/Behavioral: Negative         Past Medical History:     Past Medical History:   Diagnosis Date    Plantar fasciitis     Last assessed 2015     [facility]      Past Surgical History:     Past Surgical History:   Procedure Laterality Date    DILATION AND CURETTAGE OF UTERUS      INDUCED       Surgical treatment of spontaneous       Social History:     Social History     Socioeconomic History    Marital status: /Civil Union     Spouse name: None    Number of children: None    Years of education: None    Highest education level: None   Occupational History    None   Tobacco Use    Smoking status: Never Smoker    Smokeless tobacco: Never Used   Vaping Use    Vaping Use: Never used   Substance and Sexual Activity    Alcohol use: Never    Drug use: Never    Sexual activity: None   Other Topics Concern    None   Social History Narrative    No drug use     Non drinker/no alcohol use      Social Determinants of Health     Financial Resource Strain: Not on file   Food Insecurity: Not on file   Transportation Needs: Not on file   Physical Activity: Not on file   Stress: Not on file   Social Connections: Not on file   Intimate Partner Violence: Not on file   Housing Stability: Not on file      Family History:     Family History   Problem Relation Age of Onset    No Known Problems Mother     No Known Problems Father       Current Medications:     Current Outpatient Medications   Medication Sig Dispense Refill    predniSONE 5 mg tablet TAKE 2 TABLETS EVERY DAY BY ORAL ROUTE WITH MEALS  No current facility-administered medications for this visit  Allergies:     No Known Allergies   Physical Exam:     /76 (BP Location: Left arm, Patient Position: Sitting, Cuff Size: Standard)   Pulse 78   Temp 97 8 °F (36 6 °C) (Tympanic)   Ht 5' 7" (1 702 m)   Wt 85 kg (187 lb 6 4 oz)   SpO2 98%   BMI 29 35 kg/m²     Physical Exam  Vitals reviewed  Constitutional:       Appearance: Normal appearance  She is normal weight  HENT:      Head: Normocephalic and atraumatic  Right Ear: Tympanic membrane, ear canal and external ear normal       Left Ear: Tympanic membrane, ear canal and external ear normal       Nose: Nose normal       Mouth/Throat:      Mouth: Mucous membranes are moist       Pharynx: Oropharynx is clear  Eyes:      Conjunctiva/sclera: Conjunctivae normal       Pupils: Pupils are equal, round, and reactive to light  Cardiovascular:      Rate and Rhythm: Normal rate and regular rhythm  Heart sounds: Normal heart sounds  No murmur heard  Pulmonary:      Effort: Pulmonary effort is normal       Breath sounds: Normal breath sounds  Abdominal:      General: Abdomen is flat  Bowel sounds are normal       Palpations: Abdomen is soft  There is no hepatomegaly or splenomegaly  Tenderness: There is no abdominal tenderness  Musculoskeletal:         General: Normal range of motion  Cervical back: Normal range of motion and neck supple  Skin:     General: Skin is warm and dry  Capillary Refill: Capillary refill takes less than 2 seconds  Neurological:      General: No focal deficit present  Mental Status: She is alert and oriented to person, place, and time  Psychiatric:         Mood and Affect: Mood normal          Behavior: Behavior normal          Thought Content:  Thought content normal          Judgment: Judgment normal           [facility], [address] [id number]

## 2022-03-02 NOTE — ASSESSMENT & PLAN NOTE
New diagnosis  Following with rheum  Currently on prednisone  Unsure if she will get treatment with immunosuppressants  Also following with functional medicine

## 2022-03-02 NOTE — PATIENT INSTRUCTIONS
Wellness Visit for Adults   AMBULATORY CARE:   A wellness visit  is when you see your healthcare provider to get screened for health problems  Your healthcare provider will also give you advice on how to stay healthy  Write down your questions so you remember to ask them  Ask your healthcare provider how often you should have a wellness visit  What happens at a wellness visit:  Your healthcare provider will ask about your health, and your family history of health problems  This includes high blood pressure, heart disease, and cancer  He or she will ask if you have symptoms that concern you, if you smoke, and about your mood  You may also be asked about your intake of medicines, supplements, food, and alcohol  Any of the following may be done:  · Your weight  will be checked  Your height may also be checked so your body mass index (BMI) can be calculated  Your BMI shows if you are at a healthy weight  · Your blood pressure  and heart rate will be checked  Your temperature may also be checked  · Blood and urine tests  may be done  Blood tests may be done to check your cholesterol levels  Abnormal cholesterol levels increase your risk for heart disease and stroke  You may also need a blood or urine test to check for diabetes if you are at increased risk  Urine tests may be done to look for signs of an infection or kidney disease  · A physical exam  includes checking your heartbeat and lungs with a stethoscope  Your healthcare provider may also check your skin to look for sun damage  · Screening tests  may be recommended  A screening test is done to check for diseases that may not cause symptoms  The screening tests you may need depend on your age, gender, family history, and lifestyle habits  For example, colorectal screening may be recommended if you are 48years old or older  Screening tests you need if you are a woman:   · A Pap smear  is used to screen for cervical cancer   Pap smears are usually done every 3 to 5 years depending on your age  You may need them more often if you have had abnormal Pap smear test results in the past  Ask your healthcare provider how often you should have a Pap smear  · A mammogram  is an x-ray of your breasts to screen for breast cancer  Experts recommend mammograms every 2 years starting at age 48 years  You may need a mammogram at age 52 years or younger if you have an increased risk for breast cancer  Talk to your healthcare provider about when you should start having mammograms and how often you need them  Vaccines you may need:   · Get an influenza vaccine  every year  The influenza vaccine protects you from the flu  Several types of viruses cause the flu  The viruses change over time, so new vaccines are made each year  · Get a tetanus-diphtheria (Td) booster vaccine  every 10 years  This vaccine protects you against tetanus and diphtheria  Tetanus is a severe infection that may cause painful muscle spasms and lockjaw  Diphtheria is a severe bacterial infection that causes a thick covering in the back of your mouth and throat  · Get a human papillomavirus (HPV) vaccine  if you are female and aged 23 to 32 or male 23 to 24 and never received it  This vaccine protects you from HPV infection  HPV is the most common infection spread by sexual contact  HPV may also cause vaginal, penile, and anal cancers  · Get a pneumococcal vaccine  if you are aged 72 years or older  The pneumococcal vaccine is an injection given to protect you from pneumococcal disease  Pneumococcal disease is an infection caused by pneumococcal bacteria  The infection may cause pneumonia, meningitis, or an ear infection  · Get a shingles vaccine  if you are 60 or older, even if you have had shingles before  The shingles vaccine is an injection to protect you from the varicella-zoster virus  This is the same virus that causes chickenpox   Shingles is a painful rash that develops in people who had chickenpox or have been exposed to the virus  How to eat healthy:  My Plate is a model for planning healthy meals  It shows the types and amounts of foods that should go on your plate  Fruits and vegetables make up about half of your plate, and grains and protein make up the other half  A serving of dairy is included on the side of your plate  The amount of calories and serving sizes you need depends on your age, gender, weight, and height  Examples of healthy foods are listed below:  · Eat a variety of vegetables  such as dark green, red, and orange vegetables  You can also include canned vegetables low in sodium (salt) and frozen vegetables without added butter or sauces  · Eat a variety of fresh fruits , canned fruit in 100% juice, frozen fruit, and dried fruit  · Include whole grains  At least half of the grains you eat should be whole grains  Examples include whole-wheat bread, wheat pasta, brown rice, and whole-grain cereals such as oatmeal     · Eat a variety of protein foods such as seafood (fish and shellfish), lean meat, and poultry without skin (turkey and chicken)  Examples of lean meats include pork leg, shoulder, or tenderloin, and beef round, sirloin, tenderloin, and extra lean ground beef  Other protein foods include eggs and egg substitutes, beans, peas, soy products, nuts, and seeds  · Choose low-fat dairy products such as skim or 1% milk or low-fat yogurt, cheese, and cottage cheese  · Limit unhealthy fats  such as butter, hard margarine, and shortening  Exercise:  Exercise at least 30 minutes per day on most days of the week  Some examples of exercise include walking, biking, dancing, and swimming  You can also fit in more physical activity by taking the stairs instead of the elevator or parking farther away from stores  Include muscle strengthening activities 2 days each week  Regular exercise provides many health benefits   It helps you manage your weight, and decreases your risk for type 2 diabetes, heart disease, stroke, and high blood pressure  Exercise can also help improve your mood  Ask your healthcare provider about the best exercise plan for you  General health and safety guidelines:   · Do not smoke  Nicotine and other chemicals in cigarettes and cigars can cause lung damage  Ask your healthcare provider for information if you currently smoke and need help to quit  E-cigarettes or smokeless tobacco still contain nicotine  Talk to your healthcare provider before you use these products  · Limit alcohol  A drink of alcohol is 12 ounces of beer, 5 ounces of wine, or 1½ ounces of liquor  · Lose weight, if needed  Being overweight increases your risk of certain health conditions  These include heart disease, high blood pressure, type 2 diabetes, and certain types of cancer  · Protect your skin  Do not sunbathe or use tanning beds  Use sunscreen with a SPF 15 or higher  Apply sunscreen at least 15 minutes before you go outside  Reapply sunscreen every 2 hours  Wear protective clothing, hats, and sunglasses when you are outside  · Drive safely  Always wear your seatbelt  Make sure everyone in your car wears a seatbelt  A seatbelt can save your life if you are in an accident  Do not use your cell phone when you are driving  This could distract you and cause an accident  Pull over if you need to make a call or send a text message  · Practice safe sex  Use latex condoms if are sexually active and have more than one partner  Your healthcare provider may recommend screening tests for sexually transmitted infections (STIs)  · Wear helmets, lifejackets, and protective gear  Always wear a helmet when you ride a bike or motorcycle, go skiing, or play sports that could cause a head injury  Wear protective equipment when you play sports  Wear a lifejacket when you are on a boat or doing water sports      © Copyright [employer] 2022 Information is for End User's use only and may not be sold, redistributed or otherwise used for commercial purposes  All illustrations and images included in CareNotes® are the copyrighted property of A D A M [employer]  or Patrick Gilbert  The above information is an  only  It is not intended as medical advice for individual conditions or treatments  Talk to your doctor, nurse or pharmacist before following any medical regimen to see if it is safe and effective for you  Weight Management   AMBULATORY CARE:   Why it is important to manage your weight:  Being overweight increases your risk of health conditions such as heart disease, high blood pressure, type 2 diabetes, and certain types of cancer  It can also increase your risk for osteoarthritis, sleep apnea, and other respiratory problems  Aim for a slow, steady weight loss  Even a small amount of weight loss can lower your risk of health problems  Risks of being overweight:  Extra weight can cause many health problems, including the following:  · Diabetes (high blood sugar level)    · High blood pressure or high cholesterol    · Heart disease    · Stroke    · Gallbladder or liver disease    · Cancer of the colon, breast, prostate, liver, or kidney    · Sleep apnea    · Arthritis or gout    Screening  is done to check for health conditions before you have signs or symptoms  If you are 28to 79years old, your blood sugar level may be checked every 3 years for signs of prediabetes or diabetes  Your healthcare provider will check your blood pressure at each visit  High blood pressure can lead to a stroke or other problems  Your provider may check for signs of heart disease, cancer, or other health problems  How to lose weight safely:  A safe and healthy way to lose weight is to eat fewer calories and get regular exercise  · You can lose up about 1 pound a week by decreasing the number of calories you eat by 500 calories each day   You can decrease calories by eating smaller portion sizes or by cutting out high-calorie foods  Read labels to find out how many calories are in the foods you eat  · You can also burn calories with exercise such as walking, swimming, or biking  You will be more likely to keep weight off if you make these changes part of your lifestyle  Exercise at least 30 minutes per day on most days of the week  You can also fit in more physical activity by taking the stairs instead of the elevator or parking farther away from stores  Ask your healthcare provider about the best exercise plan for you  Healthy meal plan for weight management:  A healthy meal plan includes a variety of foods, contains fewer calories, and helps you stay healthy  A healthy meal plan includes the following:     · Eat whole-grain foods more often  A healthy meal plan should contain fiber  Fiber is the part of grains, fruits, and vegetables that is not broken down by your body  Whole-grain foods are healthy and provide extra fiber in your diet  Some examples of whole-grain foods are whole-wheat breads and pastas, oatmeal, brown rice, and bulgur  · Eat a variety of vegetables every day  Include dark, leafy greens such as spinach, kale, shyam greens, and mustard greens  Eat yellow and orange vegetables such as carrots, sweet potatoes, and winter squash  · Eat a variety of fruits every day  Choose fresh or canned fruit (canned in its own juice or light syrup) instead of juice  Fruit juice has very little or no fiber  · Eat low-fat dairy foods  Drink fat-free (skim) milk or 1% milk  Eat fat-free yogurt and low-fat cottage cheese  Try low-fat cheeses such as mozzarella and other reduced-fat cheeses  · Choose meat and other protein foods that are low in fat  Choose beans or other legumes such as split peas or lentils  Choose fish, skinless poultry (chicken or turkey), or lean cuts of red meat (beef or pork)   Before you cook meat or poultry, cut off any visible fat  · Use less fat and oil  Try baking foods instead of frying them  Add less fat, such as margarine, sour cream, regular salad dressing and mayonnaise to foods  Eat fewer high-fat foods  Some examples of high-fat foods include french fries, doughnuts, ice cream, and cakes  · Eat fewer sweets  Limit foods and drinks that are high in sugar  This includes candy, cookies, regular soda, and sweetened drinks  Ways to decrease calories:   · Eat smaller portions  ? Use a small plate with smaller servings  ? Do not eat second helpings  ? When you eat at a restaurant, ask for a box and place half of your meal in the box before you eat  ? Share an entrée with someone else  · Replace high-calorie snacks with healthy, low-calorie snacks  ? Choose fresh fruit, vegetables, fat-free rice cakes, or air-popped popcorn instead of potato chips, nuts, or chocolate  ? Choose water or calorie-free drinks instead of soda or sweetened drinks  · Do not shop for groceries when you are hungry  You may be more likely to make unhealthy food choices  Take a grocery list of healthy foods and shop after you have eaten  · Eat regular meals  Do not skip meals  Skipping meals can lead to overeating later in the day  This can make it harder for you to lose weight  Eat a healthy snack in place of a meal if you do not have time to eat a regular meal  Talk with a dietitian to help you create a meal plan and schedule that is right for you  Other things to consider as you try to lose weight:   · Be aware of situations that may give you the urge to overeat, such as eating while watching television  Find ways to avoid these situations  For example, read a book, go for a walk, or do crafts  · Meet with a weight loss support group or friends who are also trying to lose weight  This may help you stay motivated to continue working on your weight loss goals      © Copyright [employer] 2022 Information is for End User's use only and may not be sold, redistributed or otherwise used for commercial purposes  All illustrations and images included in CareNotes® are the copyrighted property of [employer] M [employer]  or Patrick Gilbert  The above information is an  only  It is not intended as medical advice for individual conditions or treatments  Talk to your doctor, nurse or pharmacist before following any medical regimen to see if it is safe and effective for you

## 2022-07-06 ENCOUNTER — OFFICE VISIT (OUTPATIENT)
Dept: RHEUMATOLOGY | Facility: CLINIC | Age: 57
End: 2022-07-06
Payer: COMMERCIAL

## 2022-07-06 VITALS
SYSTOLIC BLOOD PRESSURE: 120 MMHG | HEIGHT: 67 IN | WEIGHT: 161.6 LBS | DIASTOLIC BLOOD PRESSURE: 80 MMHG | BODY MASS INDEX: 25.36 KG/M2

## 2022-07-06 DIAGNOSIS — M05.79 RHEUMATOID ARTHRITIS INVOLVING MULTIPLE SITES WITH POSITIVE RHEUMATOID FACTOR (HCC): Primary | ICD-10-CM

## 2022-07-06 DIAGNOSIS — M25.50 PAIN IN JOINT, MULTIPLE SITES: ICD-10-CM

## 2022-07-06 PROCEDURE — 99204 OFFICE O/P NEW MOD 45 MIN: CPT | Performed by: INTERNAL MEDICINE

## 2022-07-06 RX ORDER — PREDNISONE 1 MG/1
TABLET ORAL
Qty: 168 TABLET | Refills: 2 | Status: SHIPPED | OUTPATIENT
Start: 2022-07-06 | End: 2022-08-17

## 2022-07-06 RX ORDER — FOLIC ACID 1 MG/1
1 TABLET ORAL
Qty: 90 TABLET | Refills: 3 | Status: SHIPPED | OUTPATIENT
Start: 2022-07-06

## 2022-07-06 NOTE — PROGRESS NOTES
Rheumatology Consult   Aamir Cooney 55 y o  female 1965    DATE: 2022    Reason for Consult: seropositive RA    Assessment and Plan:  RA--prednisone taper  Begin MTX 81FX weekly with folic acid daily  Topical NSAIDs  Tylenol PRN pain  Anti-inflammatory diet/exercise  Proceed with oral surgery (do not begin prednisone until after surgical clearance from oral surgeon)  Topical analgesics  Check labs 2 months after beginning MTX  F/u in 3 mos  History of Present Illness:  Aamir Cooney is a 55 y o  female Here for initial evaluation  +COVID infection in 2022  Now with seropositive RA  Not helped with anti-inflammatory diet/exercise   +malaise  Review of Systems  Review of Systems   Constitutional: Positive for fatigue  Musculoskeletal: Positive for arthralgias and joint swelling  Allergies  No Known Allergies    Current Medications      Past Medical History  Past Medical History:   Diagnosis Date    Plantar fasciitis     Last assessed 2015     Rheumatoid arteritis ([facility] [address] ) 2022       Past Surgical History  Past Surgical History:   Procedure Laterality Date    DILATION AND CURETTAGE OF UTERUS      INDUCED       Surgical treatment of spontaneous        Family History  No known autoimmune or inflammatory diseases in the family  Family History   Problem Relation Age of Onset    No Known Problems Mother     No Known Problems Father        Social History  Occupation: marketing  Social History     Substance and Sexual Activity   Alcohol Use Never     Social History     Substance and Sexual Activity   Drug Use Never     Social History     Tobacco Use   Smoking Status Never Smoker   Smokeless Tobacco Never Used        Objective:  /80   Ht 5' 7" (1 702 m)   Wt 73 3 kg (161 lb 9 6 oz)   BMI 25 31 kg/m²     Physical Exam  Musculoskeletal:         General: Swelling present  Right shoulder: Decreased range of motion  Left shoulder: Tenderness present  Decreased range of motion  Right wrist: Tenderness present  Decreased range of motion  Left wrist: Tenderness present  Decreased range of motion  Right hand: Tenderness present  Decreased range of motion  Left hand: Tenderness present  Decreased range of motion  Right knee: Effusion present  Tenderness present  Left knee: Effusion present  Tenderness present  Lab Results: I have personally reviewed pertinent reports        CBC:   , Chemistry Profile:       Invalid input(s): ALBUMIN, Coagulation Studies:   , Cardiac Studies:   , Additional Labs:   , iSTAT CHEM 8:       Invalid input(s): POTASSIUMIS, ABG:   , Toxicology:   , Last A1C/Lipid Panel/Thyroid Panel: No results found for: HGBA1C, TRIG, CHOL, HDL, LDLCALC, [id number], T3FREE, S8QLGPG, FREET4  Lab Results   Component Value Date    CRP 15 (H) 02/03/2022           Invalid input(s): URIBILINOGEN         Imaging: I have personally reviewed pertinent films in PACS

## 2022-07-06 NOTE — PATIENT INSTRUCTIONS
Take the prednisone as directed with food  Continue to eat healthy and exercise  Take folic acid every night  Take methotrexate 4 tabs one day per week after breakfast or at lunch  I ordered blood work for you to have done in 2 months  Try over the counter Voltaren gel and apply it to your painful joints up to 4 times per day

## 2022-07-11 ENCOUNTER — TELEPHONE (OUTPATIENT)
Dept: OBGYN CLINIC | Facility: HOSPITAL | Age: 57
End: 2022-07-11

## 2022-10-29 DIAGNOSIS — M05.79 RHEUMATOID ARTHRITIS INVOLVING MULTIPLE SITES WITH POSITIVE RHEUMATOID FACTOR (HCC): ICD-10-CM

## 2022-10-31 RX ORDER — PREDNISONE 1 MG/1
TABLET ORAL
Qty: 147 TABLET | Refills: 3 | Status: SHIPPED | OUTPATIENT
Start: 2022-10-31 | End: 2022-12-12

## 2023-12-18 ENCOUNTER — TELEPHONE (OUTPATIENT)
Dept: FAMILY MEDICINE CLINIC | Facility: HOSPITAL | Age: 58
End: 2023-12-18

## 2023-12-18 NOTE — TELEPHONE ENCOUNTER
----- Message from VIET Guadalupe sent at 12/18/2023  1:48 PM EST -----  Please call pt to schedule PE. Last seen 3/2/22

## 2025-04-27 NOTE — PROGRESS NOTES
Daily Note     Today's date: 2019  Patient name: Cassidy Cervantes  : 1965  MRN: [id number]  Referring provider: Adelso Vargas  Dx:   Encounter Diagnosis     ICD-10-CM    1  Tear of ulnar collateral ligament of right elbow, initial encounter S53 441A                   Subjective: some soreness      Objective: See treatment diary below      Assessment: Tolerated treatment well  Patient has full ROM   She has mild tenderness   Plan: Continue per plan of care        Precautions:  UCL tear R elbow      Manual           retrograde 5m 5m 5m 5m         MFR flexor/pro  5m 5m 5m 5m                                   Ktape flexor/cubital 2m 2m 2m 2m             Exercise Diary           Shoulder ROM 10x 10x 10x 10x         Elbow ROM supine shld 90 10x 10x 10x 10x         Wrist ROM 10x 10x 10x 10x         Wrist e/f/rd  Elbow 90 3x10 2#  3x10 2#  3x10 3#  3x10         Hammer  p/s 3x10 3x10 3x10 3x10           ER/PRO on table  2#  3x10 2#  3x10 3#  3x10         Shoulder raise   2#  3x10 2#  3x10 3#  3x10                                                                                                                                                                     HEP - ROM and prec 4x day                Modalities           Cont US med elbow 1 2 8m 8m 8m 8m         CP post  5m 5m 5m
negative...